# Patient Record
Sex: MALE | Race: WHITE | NOT HISPANIC OR LATINO | Employment: FULL TIME | URBAN - METROPOLITAN AREA
[De-identification: names, ages, dates, MRNs, and addresses within clinical notes are randomized per-mention and may not be internally consistent; named-entity substitution may affect disease eponyms.]

---

## 2019-10-10 ENCOUNTER — HISTORICAL (OUTPATIENT)
Dept: HEPATOLOGY | Facility: HOSPITAL | Age: 49
End: 2019-10-10

## 2019-10-10 LAB — POC CREATININE: 1.3 MG/DL (ref 0.6–1.3)

## 2019-10-15 ENCOUNTER — HISTORICAL (OUTPATIENT)
Dept: ADMINISTRATIVE | Facility: HOSPITAL | Age: 49
End: 2019-10-15

## 2019-10-15 LAB
ABS NEUT (OLG): 5.64 X10(3)/MCL (ref 2.1–9.2)
ALBUMIN SERPL-MCNC: 4 GM/DL (ref 3.4–5)
ALBUMIN/GLOB SERPL: 1.4 {RATIO}
ALP SERPL-CCNC: 60 UNIT/L (ref 50–136)
ALT SERPL-CCNC: 41 UNIT/L (ref 12–78)
AST SERPL-CCNC: 20 UNIT/L (ref 15–37)
BASOPHILS # BLD AUTO: 0 X10(3)/MCL (ref 0–0.2)
BASOPHILS NFR BLD AUTO: 0.4 %
BILIRUB SERPL-MCNC: 0.4 MG/DL (ref 0.2–1)
BILIRUBIN DIRECT+TOT PNL SERPL-MCNC: 0.1 MG/DL (ref 0–0.2)
BILIRUBIN DIRECT+TOT PNL SERPL-MCNC: 0.3 MG/DL (ref 0–0.8)
BUN SERPL-MCNC: 19 MG/DL (ref 7–18)
CALCIUM SERPL-MCNC: 9.7 MG/DL (ref 8.5–10.1)
CEA SERPL-MCNC: 1.8 NG/ML (ref 0–3)
CHLORIDE SERPL-SCNC: 100 MMOL/L (ref 98–107)
CO2 SERPL-SCNC: 27 MMOL/L (ref 21–32)
CREAT SERPL-MCNC: 1.22 MG/DL (ref 0.7–1.3)
EOSINOPHIL # BLD AUTO: 0.3 X10(3)/MCL (ref 0–0.9)
EOSINOPHIL NFR BLD AUTO: 3 %
ERYTHROCYTE [DISTWIDTH] IN BLOOD BY AUTOMATED COUNT: 12.4 % (ref 11.5–17)
GLOBULIN SER-MCNC: 2.9 GM/DL (ref 2.4–3.5)
GLUCOSE SERPL-MCNC: 91 MG/DL (ref 74–106)
HCT VFR BLD AUTO: 45.4 % (ref 42–52)
HGB BLD-MCNC: 14.7 GM/DL (ref 14–18)
LYMPHOCYTES # BLD AUTO: 2.3 X10(3)/MCL (ref 0.6–4.6)
LYMPHOCYTES NFR BLD AUTO: 25.2 %
MCH RBC QN AUTO: 30.4 PG (ref 27–31)
MCHC RBC AUTO-ENTMCNC: 32.4 GM/DL (ref 33–36)
MCV RBC AUTO: 93.8 FL (ref 80–94)
MONOCYTES # BLD AUTO: 1 X10(3)/MCL (ref 0.1–1.3)
MONOCYTES NFR BLD AUTO: 10.2 %
NEUTROPHILS # BLD AUTO: 5.6 X10(3)/MCL (ref 2.1–9.2)
NEUTROPHILS NFR BLD AUTO: 60.7 %
PLATELET # BLD AUTO: 305 X10(3)/MCL (ref 130–400)
PMV BLD AUTO: 8.6 FL (ref 9.4–12.4)
POTASSIUM SERPL-SCNC: 4.5 MMOL/L (ref 3.5–5.1)
PROT SERPL-MCNC: 6.9 GM/DL (ref 6.4–8.2)
RBC # BLD AUTO: 4.84 X10(6)/MCL (ref 4.7–6.1)
SODIUM SERPL-SCNC: 136 MMOL/L (ref 136–145)
WBC # SPEC AUTO: 9.3 X10(3)/MCL (ref 4.5–11.5)

## 2019-10-16 ENCOUNTER — HISTORICAL (OUTPATIENT)
Dept: RADIATION THERAPY | Facility: HOSPITAL | Age: 49
End: 2019-10-16

## 2019-10-24 ENCOUNTER — HISTORICAL (OUTPATIENT)
Dept: RADIATION THERAPY | Facility: HOSPITAL | Age: 49
End: 2019-10-24

## 2019-11-12 ENCOUNTER — HISTORICAL (OUTPATIENT)
Dept: ADMINISTRATIVE | Facility: HOSPITAL | Age: 49
End: 2019-11-12

## 2019-11-12 LAB
ABS NEUT (OLG): 2.72 X10(3)/MCL (ref 2.1–9.2)
ALBUMIN SERPL-MCNC: 4.4 GM/DL (ref 3.4–5)
ALP SERPL-CCNC: 52 UNIT/L (ref 50–136)
ALT SERPL-CCNC: 48 UNIT/L (ref 12–78)
ANION GAP SERPL CALC-SCNC: 17 MMOL/L
AST SERPL-CCNC: 27 UNIT/L (ref 15–37)
BASOPHILS # BLD AUTO: 0 X10(3)/MCL (ref 0–0.2)
BASOPHILS NFR BLD AUTO: 0.2 %
BILIRUB SERPL-MCNC: 0.5 MG/DL (ref 0.2–1)
BILIRUBIN DIRECT+TOT PNL SERPL-MCNC: 0.2 MG/DL (ref 0–0.5)
BILIRUBIN DIRECT+TOT PNL SERPL-MCNC: 0.3 MG/DL (ref 0–0.8)
BUN SERPL-MCNC: 19 MG/DL (ref 8–26)
CHLORIDE SERPL-SCNC: 96 MMOL/L (ref 98–109)
CREAT SERPL-MCNC: 1.5 MG/DL (ref 0.6–1.3)
EOSINOPHIL # BLD AUTO: 0.2 X10(3)/MCL (ref 0–0.9)
EOSINOPHIL NFR BLD AUTO: 3.9 %
ERYTHROCYTE [DISTWIDTH] IN BLOOD BY AUTOMATED COUNT: 12 % (ref 11.5–17)
GLUCOSE SERPL-MCNC: 83 MG/DL (ref 70–105)
HCT VFR BLD AUTO: 48.2 % (ref 42–52)
HCT VFR BLD CALC: 48 % (ref 38–51)
HGB BLD-MCNC: 15.9 GM/DL (ref 14–18)
HGB BLD-MCNC: 16.3 MG/DL (ref 12–17)
LIVER PROFILE INTERP: NORMAL
LYMPHOCYTES # BLD AUTO: 0.9 X10(3)/MCL (ref 0.6–4.6)
LYMPHOCYTES NFR BLD AUTO: 20.9 %
MCH RBC QN AUTO: 30.5 PG (ref 27–31)
MCHC RBC AUTO-ENTMCNC: 33 GM/DL (ref 33–36)
MCV RBC AUTO: 92.3 FL (ref 80–94)
MONOCYTES # BLD AUTO: 0.5 X10(3)/MCL (ref 0.1–1.3)
MONOCYTES NFR BLD AUTO: 11.4 %
NEUTROPHILS # BLD AUTO: 2.7 X10(3)/MCL (ref 2.1–9.2)
NEUTROPHILS NFR BLD AUTO: 63.1 %
PLATELET # BLD AUTO: 212 X10(3)/MCL (ref 130–400)
PMV BLD AUTO: 8.1 FL (ref 9.4–12.4)
POC IONIZED CALCIUM: 1.22 MMOL/L (ref 1.12–1.32)
POC TCO2: 27 MMOL/L (ref 24–29)
POTASSIUM BLD-SCNC: 4.5 MMOL/L (ref 3.5–4.9)
PROT SERPL-MCNC: 7.5 GM/DL (ref 6.4–8.2)
RBC # BLD AUTO: 5.22 X10(6)/MCL (ref 4.7–6.1)
SODIUM BLD-SCNC: 135 MMOL/L (ref 138–146)
WBC # SPEC AUTO: 4.3 X10(3)/MCL (ref 4.5–11.5)

## 2019-11-26 ENCOUNTER — HISTORICAL (OUTPATIENT)
Dept: ADMINISTRATIVE | Facility: HOSPITAL | Age: 49
End: 2019-11-26

## 2019-11-26 LAB
ABS NEUT (OLG): 3.4 X10(3)/MCL (ref 2.1–9.2)
ALBUMIN SERPL-MCNC: 3.8 GM/DL (ref 3.4–5)
ALP SERPL-CCNC: 60 UNIT/L (ref 50–136)
ALT SERPL-CCNC: 43 UNIT/L (ref 12–78)
ANION GAP SERPL CALC-SCNC: 15 MMOL/L
AST SERPL-CCNC: 34 UNIT/L (ref 15–37)
BASOPHILS # BLD AUTO: 0 X10(3)/MCL (ref 0–0.2)
BASOPHILS NFR BLD AUTO: 0.2 %
BILIRUB SERPL-MCNC: 0.6 MG/DL (ref 0.2–1)
BILIRUBIN DIRECT+TOT PNL SERPL-MCNC: 0.1 MG/DL (ref 0–0.5)
BILIRUBIN DIRECT+TOT PNL SERPL-MCNC: 0.5 MG/DL (ref 0–0.8)
BUN SERPL-MCNC: 24 MG/DL (ref 8–26)
CHLORIDE SERPL-SCNC: 97 MMOL/L (ref 98–109)
CREAT SERPL-MCNC: 1.5 MG/DL (ref 0.6–1.3)
EOSINOPHIL # BLD AUTO: 0.2 X10(3)/MCL (ref 0–0.9)
EOSINOPHIL NFR BLD AUTO: 5.4 %
ERYTHROCYTE [DISTWIDTH] IN BLOOD BY AUTOMATED COUNT: 12.5 % (ref 11.5–17)
GLUCOSE SERPL-MCNC: 77 MG/DL (ref 70–105)
HCT VFR BLD AUTO: 41.9 % (ref 42–52)
HCT VFR BLD CALC: 41 % (ref 38–51)
HGB BLD-MCNC: 13.9 MG/DL (ref 12–17)
HGB BLD-MCNC: 14.1 GM/DL (ref 14–18)
LIVER PROFILE INTERP: NORMAL
LYMPHOCYTES # BLD AUTO: 0.4 X10(3)/MCL (ref 0.6–4.6)
LYMPHOCYTES NFR BLD AUTO: 9.5 %
MCH RBC QN AUTO: 30.8 PG (ref 27–31)
MCHC RBC AUTO-ENTMCNC: 33.7 GM/DL (ref 33–36)
MCV RBC AUTO: 91.5 FL (ref 80–94)
MONOCYTES # BLD AUTO: 0.5 X10(3)/MCL (ref 0.1–1.3)
MONOCYTES NFR BLD AUTO: 11.4 %
NEUTROPHILS # BLD AUTO: 3.4 X10(3)/MCL (ref 2.1–9.2)
NEUTROPHILS NFR BLD AUTO: 73.5 %
PLATELET # BLD AUTO: 195 X10(3)/MCL (ref 130–400)
PMV BLD AUTO: 8.2 FL (ref 9.4–12.4)
POC IONIZED CALCIUM: 1.25 MMOL/L (ref 1.12–1.32)
POC TCO2: 29 MMOL/L (ref 24–29)
POTASSIUM BLD-SCNC: 4.4 MMOL/L (ref 3.5–4.9)
PROT SERPL-MCNC: 6.9 GM/DL (ref 6.4–8.2)
RBC # BLD AUTO: 4.58 X10(6)/MCL (ref 4.7–6.1)
SODIUM BLD-SCNC: 136 MMOL/L (ref 138–146)
WBC # SPEC AUTO: 4.6 X10(3)/MCL (ref 4.5–11.5)

## 2019-12-10 ENCOUNTER — HISTORICAL (OUTPATIENT)
Dept: ADMINISTRATIVE | Facility: HOSPITAL | Age: 49
End: 2019-12-10

## 2019-12-10 LAB
ABS NEUT (OLG): 2.76 X10(3)/MCL (ref 2.1–9.2)
ALBUMIN SERPL-MCNC: 3.9 GM/DL (ref 3.4–5)
ALP SERPL-CCNC: 59 UNIT/L (ref 50–136)
ALT SERPL-CCNC: 38 UNIT/L (ref 12–78)
ANION GAP SERPL CALC-SCNC: 12 MMOL/L
AST SERPL-CCNC: 17 UNIT/L (ref 15–37)
BASOPHILS # BLD AUTO: 0 X10(3)/MCL (ref 0–0.2)
BASOPHILS NFR BLD AUTO: 0.2 %
BILIRUB SERPL-MCNC: 0.6 MG/DL (ref 0.2–1)
BILIRUBIN DIRECT+TOT PNL SERPL-MCNC: 0.1 MG/DL (ref 0–0.2)
BILIRUBIN DIRECT+TOT PNL SERPL-MCNC: 0.5 MG/DL (ref 0–0.8)
BUN SERPL-MCNC: 17 MG/DL (ref 8–26)
CHLORIDE SERPL-SCNC: 101 MMOL/L (ref 98–109)
CREAT SERPL-MCNC: 1.4 MG/DL (ref 0.6–1.3)
EOSINOPHIL # BLD AUTO: 0.2 X10(3)/MCL (ref 0–0.9)
EOSINOPHIL NFR BLD AUTO: 5 %
EOSINOPHIL NFR BLD MANUAL: 5 % (ref 0–8)
ERYTHROCYTE [DISTWIDTH] IN BLOOD BY AUTOMATED COUNT: 14.1 % (ref 11.5–17)
GLUCOSE SERPL-MCNC: 94 MG/DL (ref 70–105)
HCT VFR BLD AUTO: 39.8 % (ref 42–52)
HCT VFR BLD CALC: 40 % (ref 38–51)
HGB BLD-MCNC: 13.3 GM/DL (ref 14–18)
HGB BLD-MCNC: 13.6 MG/DL (ref 12–17)
LIVER PROFILE INTERP: NORMAL
LYMPHOCYTES # BLD AUTO: 0.5 X10(3)/MCL (ref 0.6–4.6)
LYMPHOCYTES NFR BLD AUTO: 12.5 %
LYMPHOCYTES NFR BLD MANUAL: 14 % (ref 13–40)
MCH RBC QN AUTO: 31.1 PG (ref 27–31)
MCHC RBC AUTO-ENTMCNC: 33.4 GM/DL (ref 33–36)
MCV RBC AUTO: 93 FL (ref 80–94)
MONOCYTES # BLD AUTO: 0.6 X10(3)/MCL (ref 0.1–1.3)
MONOCYTES NFR BLD AUTO: 15.4 %
MONOCYTES NFR BLD MANUAL: 16 % (ref 2–11)
MYELOCYTES NFR BLD MANUAL: 1 %
NEUTROPHILS # BLD AUTO: 2.8 X10(3)/MCL (ref 2.1–9.2)
NEUTROPHILS NFR BLD AUTO: 65.2 %
NEUTROPHILS NFR BLD MANUAL: 64 % (ref 47–80)
PLATELET # BLD AUTO: 251 X10(3)/MCL (ref 130–400)
PLATELET # BLD EST: NORMAL 10*3/UL
PMV BLD AUTO: 8 FL (ref 9.4–12.4)
POC IONIZED CALCIUM: 1.19 MMOL/L (ref 1.12–1.32)
POC TCO2: 28 MMOL/L (ref 24–29)
POTASSIUM BLD-SCNC: 3.7 MMOL/L (ref 3.5–4.9)
PROT SERPL-MCNC: 6.6 GM/DL (ref 6.4–8.2)
RBC # BLD AUTO: 4.28 X10(6)/MCL (ref 4.7–6.1)
RBC MORPH BLD: NORMAL
SODIUM BLD-SCNC: 136 MMOL/L (ref 138–146)
WBC # SPEC AUTO: 4.2 X10(3)/MCL (ref 4.5–11.5)

## 2020-01-02 ENCOUNTER — HISTORICAL (OUTPATIENT)
Dept: RADIOLOGY | Facility: HOSPITAL | Age: 50
End: 2020-01-02

## 2020-01-07 ENCOUNTER — HISTORICAL (OUTPATIENT)
Dept: ADMINISTRATIVE | Facility: HOSPITAL | Age: 50
End: 2020-01-07

## 2020-01-07 LAB
ABS NEUT (OLG): 3.14 X10(3)/MCL (ref 2.1–9.2)
ALBUMIN SERPL-MCNC: 4.1 GM/DL (ref 3.4–5)
ALP SERPL-CCNC: 67 UNIT/L (ref 50–136)
ALT SERPL-CCNC: 47 UNIT/L (ref 12–78)
ANION GAP SERPL CALC-SCNC: 12 MMOL/L
AST SERPL-CCNC: 27 UNIT/L (ref 15–37)
BASOPHILS # BLD AUTO: 0 X10(3)/MCL (ref 0–0.2)
BASOPHILS NFR BLD AUTO: 0.7 %
BILIRUB SERPL-MCNC: 0.8 MG/DL (ref 0.2–1)
BILIRUBIN DIRECT+TOT PNL SERPL-MCNC: 0.1 MG/DL (ref 0–0.2)
BILIRUBIN DIRECT+TOT PNL SERPL-MCNC: 0.7 MG/DL (ref 0–0.8)
BUN SERPL-MCNC: 20 MG/DL (ref 8–26)
CHLORIDE SERPL-SCNC: 103 MMOL/L (ref 98–109)
CREAT SERPL-MCNC: 1.2 MG/DL (ref 0.6–1.3)
EOSINOPHIL # BLD AUTO: 0.1 X10(3)/MCL (ref 0–0.9)
EOSINOPHIL NFR BLD AUTO: 3.1 %
ERYTHROCYTE [DISTWIDTH] IN BLOOD BY AUTOMATED COUNT: 15.8 % (ref 11.5–17)
GLUCOSE SERPL-MCNC: 79 MG/DL (ref 70–105)
HCT VFR BLD AUTO: 39.8 % (ref 42–52)
HCT VFR BLD CALC: 40 % (ref 38–51)
HGB BLD-MCNC: 13.2 GM/DL (ref 14–18)
HGB BLD-MCNC: 13.6 MG/DL (ref 12–17)
LIVER PROFILE INTERP: NORMAL
LYMPHOCYTES # BLD AUTO: 0.7 X10(3)/MCL (ref 0.6–4.6)
LYMPHOCYTES NFR BLD AUTO: 15.1 %
MCH RBC QN AUTO: 31.7 PG (ref 27–31)
MCHC RBC AUTO-ENTMCNC: 33.2 GM/DL (ref 33–36)
MCV RBC AUTO: 95.7 FL (ref 80–94)
MONOCYTES # BLD AUTO: 0.4 X10(3)/MCL (ref 0.1–1.3)
MONOCYTES NFR BLD AUTO: 10.1 %
NEUTROPHILS # BLD AUTO: 3.1 X10(3)/MCL (ref 2.1–9.2)
NEUTROPHILS NFR BLD AUTO: 70.6 %
PLATELET # BLD AUTO: 260 X10(3)/MCL (ref 130–400)
PMV BLD AUTO: 9 FL (ref 9.4–12.4)
POC IONIZED CALCIUM: 1.15 MMOL/L (ref 1.12–1.32)
POC TCO2: 27 MMOL/L (ref 24–29)
POTASSIUM BLD-SCNC: 4.1 MMOL/L (ref 3.5–4.9)
PROT SERPL-MCNC: 6.8 GM/DL (ref 6.4–8.2)
RBC # BLD AUTO: 4.16 X10(6)/MCL (ref 4.7–6.1)
SODIUM BLD-SCNC: 137 MMOL/L (ref 138–146)
WBC # SPEC AUTO: 4.4 X10(3)/MCL (ref 4.5–11.5)

## 2020-01-09 ENCOUNTER — HISTORICAL (OUTPATIENT)
Dept: ADMINISTRATIVE | Facility: HOSPITAL | Age: 50
End: 2020-01-09

## 2020-01-09 LAB
ALBUMIN SERPL-MCNC: 3.8 GM/DL (ref 3.4–5)
ALBUMIN/GLOB SERPL: 1.6 {RATIO}
ALP SERPL-CCNC: 59 UNIT/L (ref 50–136)
ALT SERPL-CCNC: 41 UNIT/L (ref 12–78)
AST SERPL-CCNC: 21 UNIT/L (ref 15–37)
BILIRUB SERPL-MCNC: 0.6 MG/DL (ref 0.2–1)
BILIRUBIN DIRECT+TOT PNL SERPL-MCNC: 0.1 MG/DL (ref 0–0.2)
BILIRUBIN DIRECT+TOT PNL SERPL-MCNC: 0.5 MG/DL (ref 0–0.8)
BUN SERPL-MCNC: 22 MG/DL (ref 7–18)
CALCIUM SERPL-MCNC: 9.6 MG/DL (ref 8.5–10.1)
CEA SERPL-MCNC: 2.1 NG/ML (ref 0–3)
CHLORIDE SERPL-SCNC: 103 MMOL/L (ref 98–107)
CO2 SERPL-SCNC: 28 MMOL/L (ref 21–32)
CREAT SERPL-MCNC: 1.17 MG/DL (ref 0.7–1.3)
GLOBULIN SER-MCNC: 2.4 GM/DL (ref 2.4–3.5)
GLUCOSE SERPL-MCNC: 83 MG/DL (ref 74–106)
POTASSIUM SERPL-SCNC: 4.4 MMOL/L (ref 3.5–5.1)
PROT SERPL-MCNC: 6.2 GM/DL (ref 6.4–8.2)
SODIUM SERPL-SCNC: 137 MMOL/L (ref 136–145)

## 2020-01-28 ENCOUNTER — HISTORICAL (OUTPATIENT)
Dept: ADMINISTRATIVE | Facility: HOSPITAL | Age: 50
End: 2020-01-28

## 2020-01-28 LAB
ABS NEUT (OLG): 4.56 X10(3)/MCL (ref 2.1–9.2)
ALBUMIN SERPL-MCNC: 3.6 GM/DL (ref 3.4–5)
ALBUMIN/GLOB SERPL: 1.1 RATIO (ref 1.1–2)
ALP SERPL-CCNC: 92 UNIT/L (ref 50–136)
ALT SERPL-CCNC: 30 UNIT/L (ref 12–78)
AST SERPL-CCNC: 13 UNIT/L (ref 15–37)
BASOPHILS # BLD AUTO: 0 X10(3)/MCL (ref 0–0.2)
BASOPHILS NFR BLD AUTO: 0.6 %
BILIRUB SERPL-MCNC: 0.4 MG/DL (ref 0.2–1)
BILIRUBIN DIRECT+TOT PNL SERPL-MCNC: 0.1 MG/DL (ref 0–0.5)
BILIRUBIN DIRECT+TOT PNL SERPL-MCNC: 0.3 MG/DL (ref 0–0.8)
BUN SERPL-MCNC: 17 MG/DL (ref 7–18)
CALCIUM SERPL-MCNC: 9.3 MG/DL (ref 8.5–10.1)
CEA SERPL-MCNC: 1.7 NG/ML (ref 0–3)
CHLORIDE SERPL-SCNC: 104 MMOL/L (ref 98–107)
CO2 SERPL-SCNC: 30 MMOL/L (ref 21–32)
CREAT SERPL-MCNC: 1.02 MG/DL (ref 0.7–1.3)
EOSINOPHIL # BLD AUTO: 0.2 X10(3)/MCL (ref 0–0.9)
EOSINOPHIL NFR BLD AUTO: 3 %
ERYTHROCYTE [DISTWIDTH] IN BLOOD BY AUTOMATED COUNT: 14.4 % (ref 11.5–17)
GLOBULIN SER-MCNC: 3.4 GM/DL (ref 2.4–3.5)
GLUCOSE SERPL-MCNC: 92 MG/DL (ref 74–106)
HCT VFR BLD AUTO: 38.3 % (ref 42–52)
HGB BLD-MCNC: 12.4 GM/DL (ref 14–18)
LYMPHOCYTES # BLD AUTO: 1.2 X10(3)/MCL (ref 0.6–4.6)
LYMPHOCYTES NFR BLD AUTO: 17.6 %
MCH RBC QN AUTO: 31.6 PG (ref 27–31)
MCHC RBC AUTO-ENTMCNC: 32.4 GM/DL (ref 33–36)
MCV RBC AUTO: 97.7 FL (ref 80–94)
MONOCYTES # BLD AUTO: 0.7 X10(3)/MCL (ref 0.1–1.3)
MONOCYTES NFR BLD AUTO: 9.8 %
NEUTROPHILS # BLD AUTO: 4.6 X10(3)/MCL (ref 2.1–9.2)
NEUTROPHILS NFR BLD AUTO: 67.8 %
PLATELET # BLD AUTO: 413 X10(3)/MCL (ref 130–400)
PMV BLD AUTO: 8.2 FL (ref 9.4–12.4)
POTASSIUM SERPL-SCNC: 4.4 MMOL/L (ref 3.5–5.1)
PROT SERPL-MCNC: 7 GM/DL (ref 6.4–8.2)
RBC # BLD AUTO: 3.92 X10(6)/MCL (ref 4.7–6.1)
SODIUM SERPL-SCNC: 139 MMOL/L (ref 136–145)
WBC # SPEC AUTO: 6.7 X10(3)/MCL (ref 4.5–11.5)

## 2020-02-05 ENCOUNTER — HISTORICAL (OUTPATIENT)
Dept: ADMINISTRATIVE | Facility: HOSPITAL | Age: 50
End: 2020-02-05

## 2020-02-26 ENCOUNTER — HISTORICAL (OUTPATIENT)
Dept: INFUSION THERAPY | Facility: HOSPITAL | Age: 50
End: 2020-02-26

## 2020-02-26 LAB
ABS NEUT (OLG): 3.06 X10(3)/MCL (ref 2.1–9.2)
ALBUMIN SERPL-MCNC: 3.8 GM/DL (ref 3.4–5)
ALP SERPL-CCNC: 86 UNIT/L (ref 50–136)
ALT SERPL-CCNC: 36 UNIT/L (ref 12–78)
ANION GAP SERPL CALC-SCNC: 15 MMOL/L
AST SERPL-CCNC: 21 UNIT/L (ref 15–37)
BASOPHILS # BLD AUTO: 0 X10(3)/MCL (ref 0–0.2)
BASOPHILS NFR BLD AUTO: 0.4 %
BILIRUB SERPL-MCNC: 0.4 MG/DL (ref 0.2–1)
BILIRUBIN DIRECT+TOT PNL SERPL-MCNC: 0.1 MG/DL (ref 0–0.5)
BILIRUBIN DIRECT+TOT PNL SERPL-MCNC: 0.3 MG/DL (ref 0–0.8)
BUN SERPL-MCNC: 18 MG/DL (ref 8–26)
CEA SERPL-MCNC: 1.5 NG/ML (ref 0–3)
CHLORIDE SERPL-SCNC: 100 MMOL/L (ref 98–109)
CREAT SERPL-MCNC: 1.1 MG/DL (ref 0.6–1.3)
EOSINOPHIL # BLD AUTO: 0.2 X10(3)/MCL (ref 0–0.9)
EOSINOPHIL NFR BLD AUTO: 3.4 %
ERYTHROCYTE [DISTWIDTH] IN BLOOD BY AUTOMATED COUNT: 13.2 % (ref 11.5–17)
GLUCOSE SERPL-MCNC: 99 MG/DL (ref 70–105)
HCT VFR BLD AUTO: 40 % (ref 42–52)
HCT VFR BLD CALC: 39 % (ref 38–51)
HGB BLD-MCNC: 13 GM/DL (ref 14–18)
HGB BLD-MCNC: 13.3 MG/DL (ref 12–17)
LIVER PROFILE INTERP: NORMAL
LYMPHOCYTES # BLD AUTO: 1 X10(3)/MCL (ref 0.6–4.6)
LYMPHOCYTES NFR BLD AUTO: 21.2 %
MCH RBC QN AUTO: 32.1 PG (ref 27–31)
MCHC RBC AUTO-ENTMCNC: 32.5 GM/DL (ref 33–36)
MCV RBC AUTO: 98.8 FL (ref 80–94)
MONOCYTES # BLD AUTO: 0.5 X10(3)/MCL (ref 0.1–1.3)
MONOCYTES NFR BLD AUTO: 10.1 %
NEUTROPHILS # BLD AUTO: 3.1 X10(3)/MCL (ref 2.1–9.2)
NEUTROPHILS NFR BLD AUTO: 64.3 %
PLATELET # BLD AUTO: 262 X10(3)/MCL (ref 130–400)
PMV BLD AUTO: 8.6 FL (ref 9.4–12.4)
POC IONIZED CALCIUM: 1.23 MMOL/L (ref 1.12–1.32)
POC TCO2: 27 MMOL/L (ref 24–29)
POTASSIUM BLD-SCNC: 3.9 MMOL/L (ref 3.5–4.9)
PROT SERPL-MCNC: 6.8 GM/DL (ref 6.4–8.2)
RBC # BLD AUTO: 4.05 X10(6)/MCL (ref 4.7–6.1)
SODIUM BLD-SCNC: 137 MMOL/L (ref 138–146)
WBC # SPEC AUTO: 4.8 X10(3)/MCL (ref 4.5–11.5)

## 2020-03-03 ENCOUNTER — HISTORICAL (OUTPATIENT)
Dept: ADMINISTRATIVE | Facility: HOSPITAL | Age: 50
End: 2020-03-03

## 2020-03-03 LAB
ABS NEUT (OLG): 2.7 X10(3)/MCL (ref 2.1–9.2)
ALBUMIN SERPL-MCNC: 4 GM/DL (ref 3.4–5)
ALBUMIN/GLOB SERPL: 1.3 RATIO (ref 1.1–2)
ALP SERPL-CCNC: 76 UNIT/L (ref 50–136)
ALT SERPL-CCNC: 35 UNIT/L (ref 12–78)
AST SERPL-CCNC: 20 UNIT/L (ref 15–37)
BASOPHILS # BLD AUTO: 0 X10(3)/MCL (ref 0–0.2)
BASOPHILS NFR BLD AUTO: 0.4 %
BILIRUB SERPL-MCNC: 0.8 MG/DL (ref 0.2–1)
BILIRUBIN DIRECT+TOT PNL SERPL-MCNC: 0.2 MG/DL (ref 0–0.5)
BILIRUBIN DIRECT+TOT PNL SERPL-MCNC: 0.6 MG/DL (ref 0–0.8)
BUN SERPL-MCNC: 28 MG/DL (ref 7–18)
CALCIUM SERPL-MCNC: 9.9 MG/DL (ref 8.5–10.1)
CHLORIDE SERPL-SCNC: 98 MMOL/L (ref 98–107)
CO2 SERPL-SCNC: 26 MMOL/L (ref 21–32)
CREAT SERPL-MCNC: 1.35 MG/DL (ref 0.7–1.3)
EOSINOPHIL # BLD AUTO: 0 X10(3)/MCL (ref 0–0.9)
EOSINOPHIL NFR BLD AUTO: 0.6 %
EOSINOPHIL NFR BLD MANUAL: 1 % (ref 0–8)
ERYTHROCYTE [DISTWIDTH] IN BLOOD BY AUTOMATED COUNT: 12.2 % (ref 11.5–17)
GLOBULIN SER-MCNC: 3 GM/DL (ref 2.4–3.5)
GLUCOSE SERPL-MCNC: 102 MG/DL (ref 74–106)
HCT VFR BLD AUTO: 42.1 % (ref 42–52)
HGB BLD-MCNC: 14.4 GM/DL (ref 14–18)
LYMPHOCYTES # BLD AUTO: 1.2 X10(3)/MCL (ref 0.6–4.6)
LYMPHOCYTES NFR BLD AUTO: 24.7 %
LYMPHOCYTES NFR BLD MANUAL: 24 % (ref 13–40)
MCH RBC QN AUTO: 31.8 PG (ref 27–31)
MCHC RBC AUTO-ENTMCNC: 34.2 GM/DL (ref 33–36)
MCV RBC AUTO: 92.9 FL (ref 80–94)
MONOCYTES # BLD AUTO: 0.8 X10(3)/MCL (ref 0.1–1.3)
MONOCYTES NFR BLD AUTO: 17.4 %
MONOCYTES NFR BLD MANUAL: 14 % (ref 2–11)
NEUTROPHILS # BLD AUTO: 2.7 X10(3)/MCL (ref 2.1–9.2)
NEUTROPHILS NFR BLD AUTO: 56.7 %
NEUTROPHILS NFR BLD MANUAL: 61 % (ref 47–80)
PLATELET # BLD AUTO: 304 X10(3)/MCL (ref 130–400)
PLATELET # BLD EST: NORMAL 10*3/UL
PMV BLD AUTO: 8.5 FL (ref 9.4–12.4)
POTASSIUM SERPL-SCNC: 4.1 MMOL/L (ref 3.5–5.1)
PROT SERPL-MCNC: 7 GM/DL (ref 6.4–8.2)
RBC # BLD AUTO: 4.53 X10(6)/MCL (ref 4.7–6.1)
RBC MORPH BLD: NORMAL
SODIUM SERPL-SCNC: 132 MMOL/L (ref 136–145)
WBC # SPEC AUTO: 4.8 X10(3)/MCL (ref 4.5–11.5)

## 2020-03-12 ENCOUNTER — HISTORICAL (OUTPATIENT)
Dept: RADIATION THERAPY | Facility: HOSPITAL | Age: 50
End: 2020-03-12

## 2020-03-12 ENCOUNTER — HISTORICAL (OUTPATIENT)
Dept: HEMATOLOGY/ONCOLOGY | Facility: CLINIC | Age: 50
End: 2020-03-12

## 2020-03-12 LAB
ABS NEUT (OLG): 3.79 X10(3)/MCL (ref 2.1–9.2)
ALBUMIN SERPL-MCNC: 3.6 GM/DL (ref 3.4–5)
ALBUMIN/GLOB SERPL: 1.2 {RATIO}
ALP SERPL-CCNC: 79 UNIT/L (ref 45–117)
ALT SERPL-CCNC: 104 UNIT/L (ref 16–61)
AST SERPL-CCNC: 53 UNIT/L (ref 15–37)
BASOPHILS # BLD AUTO: 0 X10(3)/MCL (ref 0–0.2)
BASOPHILS NFR BLD AUTO: 0.2 %
BILIRUB SERPL-MCNC: 0.2 MG/DL (ref 0.2–1)
BILIRUBIN DIRECT+TOT PNL SERPL-MCNC: <0.1 MG/DL (ref 0–0.2)
BILIRUBIN DIRECT+TOT PNL SERPL-MCNC: >0.1 MG/DL (ref 0–1)
BUN SERPL-MCNC: 15 MG/DL (ref 7–18)
CALCIUM SERPL-MCNC: 8.7 MG/DL (ref 8.5–10.1)
CHLORIDE SERPL-SCNC: 105 MMOL/L (ref 98–107)
CO2 SERPL-SCNC: 26 MMOL/L (ref 21–32)
CREAT SERPL-MCNC: 1.38 MG/DL (ref 0.7–1.3)
EOSINOPHIL # BLD AUTO: 0 X10(3)/MCL (ref 0–0.9)
EOSINOPHIL NFR BLD AUTO: 0.8 %
ERYTHROCYTE [DISTWIDTH] IN BLOOD BY AUTOMATED COUNT: 12.6 % (ref 11.5–17)
GLOBULIN SER-MCNC: 3 GM/DL (ref 2–4)
GLUCOSE SERPL-MCNC: 133 MG/DL (ref 74–106)
HCT VFR BLD AUTO: 37.2 % (ref 42–52)
HGB BLD-MCNC: 11.6 GM/DL (ref 14–18)
LYMPHOCYTES # BLD AUTO: 0.8 X10(3)/MCL (ref 0.6–4.6)
LYMPHOCYTES NFR BLD AUTO: 15.7 %
MCH RBC QN AUTO: 32.2 PG (ref 27–31)
MCHC RBC AUTO-ENTMCNC: 31.2 GM/DL (ref 33–36)
MCV RBC AUTO: 103.3 FL (ref 80–94)
MONOCYTES # BLD AUTO: 0.4 X10(3)/MCL (ref 0.1–1.3)
MONOCYTES NFR BLD AUTO: 8.4 %
NEUTROPHILS # BLD AUTO: 3.8 X10(3)/MCL (ref 2.1–9.2)
NEUTROPHILS NFR BLD AUTO: 74.1 %
PLATELET # BLD AUTO: 230 X10(3)/MCL (ref 130–400)
PMV BLD AUTO: 9 FL (ref 9.4–12.4)
POTASSIUM SERPL-SCNC: 4.3 MMOL/L (ref 3.5–5.1)
PROT SERPL-MCNC: 6.5 GM/DL (ref 6.4–8.2)
RBC # BLD AUTO: 3.6 X10(6)/MCL (ref 4.7–6.1)
SODIUM SERPL-SCNC: 139 MMOL/L (ref 136–145)
WBC # SPEC AUTO: 5.1 X10(3)/MCL (ref 4.5–11.5)

## 2020-03-18 ENCOUNTER — HISTORICAL (OUTPATIENT)
Dept: INFUSION THERAPY | Facility: HOSPITAL | Age: 50
End: 2020-03-18

## 2020-03-18 LAB
ABS NEUT (OLG): 2.23 X10(3)/MCL (ref 2.1–9.2)
ANION GAP SERPL CALC-SCNC: 19 MMOL/L
BASOPHILS # BLD AUTO: 0 X10(3)/MCL (ref 0–0.2)
BASOPHILS NFR BLD AUTO: 0.4 %
BUN SERPL-MCNC: 18 MG/DL (ref 8–26)
CHLORIDE SERPL-SCNC: 99 MMOL/L (ref 98–109)
CREAT SERPL-MCNC: 1.2 MG/DL (ref 0.6–1.3)
EOSINOPHIL # BLD AUTO: 0.1 X10(3)/MCL (ref 0–0.9)
EOSINOPHIL NFR BLD AUTO: 2.4 %
ERYTHROCYTE [DISTWIDTH] IN BLOOD BY AUTOMATED COUNT: 14 % (ref 11.5–17)
GLUCOSE SERPL-MCNC: 83 MG/DL (ref 70–105)
HCT VFR BLD AUTO: 39.7 % (ref 42–52)
HCT VFR BLD CALC: 40 % (ref 38–51)
HGB BLD-MCNC: 12.9 GM/DL (ref 14–18)
HGB BLD-MCNC: 13.6 MG/DL (ref 12–17)
LYMPHOCYTES # BLD AUTO: 1.3 X10(3)/MCL (ref 0.6–4.6)
LYMPHOCYTES NFR BLD AUTO: 28 %
MCH RBC QN AUTO: 32.3 PG (ref 27–31)
MCHC RBC AUTO-ENTMCNC: 32.5 GM/DL (ref 33–36)
MCV RBC AUTO: 99.5 FL (ref 80–94)
MONOCYTES # BLD AUTO: 0.9 X10(3)/MCL (ref 0.1–1.3)
MONOCYTES NFR BLD AUTO: 18.9 %
NEUTROPHILS # BLD AUTO: 2.2 X10(3)/MCL (ref 2.1–9.2)
NEUTROPHILS NFR BLD AUTO: 49.2 %
PLATELET # BLD AUTO: 286 X10(3)/MCL (ref 130–400)
PMV BLD AUTO: 8.1 FL (ref 9.4–12.4)
POC IONIZED CALCIUM: 1.25 MMOL/L (ref 1.12–1.32)
POC TCO2: 25 MMOL/L (ref 24–29)
POTASSIUM BLD-SCNC: 3.6 MMOL/L (ref 3.5–4.9)
RBC # BLD AUTO: 3.99 X10(6)/MCL (ref 4.7–6.1)
SODIUM BLD-SCNC: 138 MMOL/L (ref 138–146)
WBC # SPEC AUTO: 4.5 X10(3)/MCL (ref 4.5–11.5)

## 2020-03-24 ENCOUNTER — HISTORICAL (OUTPATIENT)
Dept: ADMINISTRATIVE | Facility: HOSPITAL | Age: 50
End: 2020-03-24

## 2020-03-24 LAB
ABS NEUT (OLG): 2.34 X10(3)/MCL (ref 2.1–9.2)
ALBUMIN SERPL-MCNC: 3.9 GM/DL (ref 3.4–5)
ALBUMIN/GLOB SERPL: 1.3 RATIO (ref 1.1–2)
ALP SERPL-CCNC: 88 UNIT/L (ref 50–136)
ALT SERPL-CCNC: 74 UNIT/L (ref 12–78)
AST SERPL-CCNC: 23 UNIT/L (ref 15–37)
BASOPHILS # BLD AUTO: 0 X10(3)/MCL (ref 0–0.2)
BASOPHILS NFR BLD AUTO: 0.2 %
BILIRUB SERPL-MCNC: 0.4 MG/DL (ref 0.2–1)
BILIRUBIN DIRECT+TOT PNL SERPL-MCNC: 0.1 MG/DL (ref 0–0.5)
BILIRUBIN DIRECT+TOT PNL SERPL-MCNC: 0.3 MG/DL (ref 0–0.8)
BUN SERPL-MCNC: 16 MG/DL (ref 7–18)
CALCIUM SERPL-MCNC: 9.2 MG/DL (ref 8.5–10.1)
CHLORIDE SERPL-SCNC: 102 MMOL/L (ref 98–107)
CO2 SERPL-SCNC: 32 MMOL/L (ref 21–32)
CREAT SERPL-MCNC: 1.05 MG/DL (ref 0.7–1.3)
EOSINOPHIL # BLD AUTO: 0.1 X10(3)/MCL (ref 0–0.9)
EOSINOPHIL NFR BLD AUTO: 1.4 %
ERYTHROCYTE [DISTWIDTH] IN BLOOD BY AUTOMATED COUNT: 13.3 % (ref 11.5–17)
GLOBULIN SER-MCNC: 3.1 GM/DL (ref 2.4–3.5)
GLUCOSE SERPL-MCNC: 87 MG/DL (ref 74–106)
HCT VFR BLD AUTO: 41.9 % (ref 42–52)
HGB BLD-MCNC: 13.8 GM/DL (ref 14–18)
LYMPHOCYTES # BLD AUTO: 1.1 X10(3)/MCL (ref 0.6–4.6)
LYMPHOCYTES NFR BLD AUTO: 26 %
MCH RBC QN AUTO: 31.9 PG (ref 27–31)
MCHC RBC AUTO-ENTMCNC: 32.9 GM/DL (ref 33–36)
MCV RBC AUTO: 96.8 FL (ref 80–94)
MONOCYTES # BLD AUTO: 0.8 X10(3)/MCL (ref 0.1–1.3)
MONOCYTES NFR BLD AUTO: 18 %
NEUTROPHILS # BLD AUTO: 2.3 X10(3)/MCL (ref 2.1–9.2)
NEUTROPHILS NFR BLD AUTO: 53.9 %
PLATELET # BLD AUTO: 217 X10(3)/MCL (ref 130–400)
PMV BLD AUTO: 8.5 FL (ref 9.4–12.4)
POTASSIUM SERPL-SCNC: 4.2 MMOL/L (ref 3.5–5.1)
PROT SERPL-MCNC: 7 GM/DL (ref 6.4–8.2)
RBC # BLD AUTO: 4.33 X10(6)/MCL (ref 4.7–6.1)
SODIUM SERPL-SCNC: 136 MMOL/L (ref 136–145)
WBC # SPEC AUTO: 4.3 X10(3)/MCL (ref 4.5–11.5)

## 2020-04-08 ENCOUNTER — HISTORICAL (OUTPATIENT)
Dept: INFUSION THERAPY | Facility: HOSPITAL | Age: 50
End: 2020-04-08

## 2020-04-08 LAB
ABS NEUT (OLG): 2.3 X10(3)/MCL (ref 2.1–9.2)
ANION GAP SERPL CALC-SCNC: 14 MMOL/L
BASOPHILS # BLD AUTO: 0 X10(3)/MCL (ref 0–0.2)
BASOPHILS NFR BLD AUTO: 0.3 %
BUN SERPL-MCNC: 20 MG/DL (ref 8–26)
CHLORIDE SERPL-SCNC: 103 MMOL/L (ref 98–109)
CREAT SERPL-MCNC: 1.2 MG/DL (ref 0.6–1.3)
EOSINOPHIL # BLD AUTO: 0.1 X10(3)/MCL (ref 0–0.9)
EOSINOPHIL NFR BLD AUTO: 2 %
ERYTHROCYTE [DISTWIDTH] IN BLOOD BY AUTOMATED COUNT: 15.3 % (ref 11.5–17)
GLUCOSE SERPL-MCNC: 84 MG/DL (ref 70–105)
HCT VFR BLD AUTO: 38.3 % (ref 42–52)
HCT VFR BLD CALC: 38 % (ref 38–51)
HGB BLD-MCNC: 12.7 GM/DL (ref 14–18)
HGB BLD-MCNC: 12.9 MG/DL (ref 12–17)
LYMPHOCYTES # BLD AUTO: 0.8 X10(3)/MCL (ref 0.6–4.6)
LYMPHOCYTES NFR BLD AUTO: 21.5 %
MCH RBC QN AUTO: 32.4 PG (ref 27–31)
MCHC RBC AUTO-ENTMCNC: 33.2 GM/DL (ref 33–36)
MCV RBC AUTO: 97.7 FL (ref 80–94)
MONOCYTES # BLD AUTO: 0.7 X10(3)/MCL (ref 0.1–1.3)
MONOCYTES NFR BLD AUTO: 17.9 %
NEUTROPHILS # BLD AUTO: 2.3 X10(3)/MCL (ref 2.1–9.2)
NEUTROPHILS NFR BLD AUTO: 58 %
PLATELET # BLD AUTO: 209 X10(3)/MCL (ref 130–400)
PMV BLD AUTO: 8.4 FL (ref 9.4–12.4)
POC IONIZED CALCIUM: 1.19 MMOL/L (ref 1.12–1.32)
POC TCO2: 26 MMOL/L (ref 24–29)
POTASSIUM BLD-SCNC: 3.8 MMOL/L (ref 3.5–4.9)
RBC # BLD AUTO: 3.92 X10(6)/MCL (ref 4.7–6.1)
SODIUM BLD-SCNC: 137 MMOL/L (ref 138–146)
WBC # SPEC AUTO: 4 X10(3)/MCL (ref 4.5–11.5)

## 2020-04-15 ENCOUNTER — HISTORICAL (OUTPATIENT)
Dept: ADMINISTRATIVE | Facility: HOSPITAL | Age: 50
End: 2020-04-15

## 2020-04-15 LAB
ABS NEUT (OLG): 2.43 X10(3)/MCL (ref 2.1–9.2)
ALBUMIN SERPL-MCNC: 3.7 GM/DL (ref 3.5–5)
ALBUMIN/GLOB SERPL: 1.2 RATIO (ref 1.1–2)
ALP SERPL-CCNC: 86 UNIT/L (ref 40–150)
ALT SERPL-CCNC: 40 UNIT/L (ref 0–55)
AST SERPL-CCNC: 25 UNIT/L (ref 5–34)
BASOPHILS # BLD AUTO: 0 X10(3)/MCL (ref 0–0.2)
BASOPHILS NFR BLD AUTO: 0.2 %
BILIRUB SERPL-MCNC: 0.4 MG/DL
BILIRUBIN DIRECT+TOT PNL SERPL-MCNC: 0.1 MG/DL (ref 0–0.5)
BILIRUBIN DIRECT+TOT PNL SERPL-MCNC: 0.3 MG/DL (ref 0–0.8)
BUN SERPL-MCNC: 20 MG/DL (ref 8.4–25.7)
CALCIUM SERPL-MCNC: 8.9 MG/DL (ref 8.4–10.2)
CHLORIDE SERPL-SCNC: 103 MMOL/L (ref 98–107)
CO2 SERPL-SCNC: 25 MMOL/L (ref 22–29)
CREAT SERPL-MCNC: 1.12 MG/DL (ref 0.73–1.18)
EOSINOPHIL # BLD AUTO: 0.1 X10(3)/MCL (ref 0–0.9)
EOSINOPHIL NFR BLD AUTO: 1.4 %
ERYTHROCYTE [DISTWIDTH] IN BLOOD BY AUTOMATED COUNT: 14.4 % (ref 11.5–17)
GLOBULIN SER-MCNC: 3 GM/DL (ref 2.4–3.5)
GLUCOSE SERPL-MCNC: 99 MG/DL (ref 74–100)
HCT VFR BLD AUTO: 39.1 % (ref 42–52)
HGB BLD-MCNC: 13.3 GM/DL (ref 14–18)
LYMPHOCYTES # BLD AUTO: 0.9 X10(3)/MCL (ref 0.6–4.6)
LYMPHOCYTES NFR BLD AUTO: 21.5 %
MCH RBC QN AUTO: 32.3 PG (ref 27–31)
MCHC RBC AUTO-ENTMCNC: 34 GM/DL (ref 33–36)
MCV RBC AUTO: 94.9 FL (ref 80–94)
MONOCYTES # BLD AUTO: 0.9 X10(3)/MCL (ref 0.1–1.3)
MONOCYTES NFR BLD AUTO: 19.9 %
NEUTROPHILS # BLD AUTO: 2.4 X10(3)/MCL (ref 2.1–9.2)
NEUTROPHILS NFR BLD AUTO: 56.3 %
PLATELET # BLD AUTO: 192 X10(3)/MCL (ref 130–400)
PMV BLD AUTO: 8.9 FL (ref 9.4–12.4)
POTASSIUM SERPL-SCNC: 4.1 MMOL/L (ref 3.5–5.1)
PROT SERPL-MCNC: 6.7 GM/DL (ref 6.4–8.3)
RBC # BLD AUTO: 4.12 X10(6)/MCL (ref 4.7–6.1)
SODIUM SERPL-SCNC: 139 MMOL/L (ref 136–145)
WBC # SPEC AUTO: 4.3 X10(3)/MCL (ref 4.5–11.5)

## 2020-04-29 ENCOUNTER — HISTORICAL (OUTPATIENT)
Dept: INFUSION THERAPY | Facility: HOSPITAL | Age: 50
End: 2020-04-29

## 2020-04-29 LAB
ABS NEUT (OLG): 3 X10(3)/MCL (ref 2.1–9.2)
ANION GAP SERPL CALC-SCNC: 14 MMOL/L
BASOPHILS # BLD AUTO: 0 X10(3)/MCL (ref 0–0.2)
BASOPHILS NFR BLD AUTO: 0.4 %
BUN SERPL-MCNC: 13 MG/DL (ref 8–26)
CHLORIDE SERPL-SCNC: 103 MMOL/L (ref 98–109)
CREAT SERPL-MCNC: 1 MG/DL (ref 0.6–1.3)
EOSINOPHIL # BLD AUTO: 0.1 X10(3)/MCL (ref 0–0.9)
EOSINOPHIL NFR BLD AUTO: 1.4 %
ERYTHROCYTE [DISTWIDTH] IN BLOOD BY AUTOMATED COUNT: 16.7 % (ref 11.5–17)
GLUCOSE SERPL-MCNC: 98 MG/DL (ref 70–105)
HCT VFR BLD AUTO: 38.5 % (ref 42–52)
HCT VFR BLD CALC: 37 % (ref 38–51)
HGB BLD-MCNC: 12.6 MG/DL (ref 12–17)
HGB BLD-MCNC: 12.7 GM/DL (ref 14–18)
LYMPHOCYTES # BLD AUTO: 0.9 X10(3)/MCL (ref 0.6–4.6)
LYMPHOCYTES NFR BLD AUTO: 18.4 %
MCH RBC QN AUTO: 32.3 PG (ref 27–31)
MCHC RBC AUTO-ENTMCNC: 33 GM/DL (ref 33–36)
MCV RBC AUTO: 98 FL (ref 80–94)
MONOCYTES # BLD AUTO: 0.9 X10(3)/MCL (ref 0.1–1.3)
MONOCYTES NFR BLD AUTO: 18 %
NEUTROPHILS # BLD AUTO: 3 X10(3)/MCL (ref 2.1–9.2)
NEUTROPHILS NFR BLD AUTO: 61.4 %
PLATELET # BLD AUTO: 221 X10(3)/MCL (ref 130–400)
PMV BLD AUTO: 8.6 FL (ref 9.4–12.4)
POC IONIZED CALCIUM: 1.24 MMOL/L (ref 1.12–1.32)
POC TCO2: 26 MMOL/L (ref 24–29)
POTASSIUM BLD-SCNC: 4 MMOL/L (ref 3.5–4.9)
RBC # BLD AUTO: 3.93 X10(6)/MCL (ref 4.7–6.1)
SODIUM BLD-SCNC: 138 MMOL/L (ref 138–146)
WBC # SPEC AUTO: 4.9 X10(3)/MCL (ref 4.5–11.5)

## 2020-05-06 ENCOUNTER — HISTORICAL (OUTPATIENT)
Dept: ADMINISTRATIVE | Facility: HOSPITAL | Age: 50
End: 2020-05-06

## 2020-05-06 LAB
ABS NEUT (OLG): 3.82 X10(3)/MCL (ref 2.1–9.2)
ALBUMIN SERPL-MCNC: 3.8 GM/DL (ref 3.5–5)
ALBUMIN/GLOB SERPL: 1.2 RATIO (ref 1.1–2)
ALP SERPL-CCNC: 83 UNIT/L (ref 40–150)
ALT SERPL-CCNC: 29 UNIT/L (ref 0–55)
AST SERPL-CCNC: 28 UNIT/L (ref 5–34)
BASOPHILS # BLD AUTO: 0 X10(3)/MCL (ref 0–0.2)
BASOPHILS NFR BLD AUTO: 0.3 %
BILIRUB SERPL-MCNC: 0.4 MG/DL
BILIRUBIN DIRECT+TOT PNL SERPL-MCNC: 0.2 MG/DL (ref 0–0.5)
BILIRUBIN DIRECT+TOT PNL SERPL-MCNC: 0.2 MG/DL (ref 0–0.8)
BUN SERPL-MCNC: 21 MG/DL (ref 8.4–25.7)
CALCIUM SERPL-MCNC: 9.7 MG/DL (ref 8.4–10.2)
CHLORIDE SERPL-SCNC: 102 MMOL/L (ref 98–107)
CO2 SERPL-SCNC: 25 MMOL/L (ref 22–29)
CREAT SERPL-MCNC: 1.16 MG/DL (ref 0.73–1.18)
EOSINOPHIL # BLD AUTO: 0 X10(3)/MCL (ref 0–0.9)
EOSINOPHIL NFR BLD AUTO: 0.6 %
ERYTHROCYTE [DISTWIDTH] IN BLOOD BY AUTOMATED COUNT: 15.7 % (ref 11.5–17)
GLOBULIN SER-MCNC: 3.1 GM/DL (ref 2.4–3.5)
GLUCOSE SERPL-MCNC: 93 MG/DL (ref 74–100)
HCT VFR BLD AUTO: 41.7 % (ref 42–52)
HGB BLD-MCNC: 14.3 GM/DL (ref 14–18)
LYMPHOCYTES # BLD AUTO: 1.3 X10(3)/MCL (ref 0.6–4.6)
LYMPHOCYTES NFR BLD AUTO: 19.4 %
MCH RBC QN AUTO: 32.1 PG (ref 27–31)
MCHC RBC AUTO-ENTMCNC: 34.3 GM/DL (ref 33–36)
MCV RBC AUTO: 93.7 FL (ref 80–94)
MONOCYTES # BLD AUTO: 1.3 X10(3)/MCL (ref 0.1–1.3)
MONOCYTES NFR BLD AUTO: 19.4 %
NEUTROPHILS # BLD AUTO: 3.8 X10(3)/MCL (ref 2.1–9.2)
NEUTROPHILS NFR BLD AUTO: 57.9 %
PLATELET # BLD AUTO: 235 X10(3)/MCL (ref 130–400)
PMV BLD AUTO: 8.7 FL (ref 9.4–12.4)
POTASSIUM SERPL-SCNC: 4.3 MMOL/L (ref 3.5–5.1)
PROT SERPL-MCNC: 6.9 GM/DL (ref 6.4–8.3)
RBC # BLD AUTO: 4.45 X10(6)/MCL (ref 4.7–6.1)
SODIUM SERPL-SCNC: 136 MMOL/L (ref 136–145)
WBC # SPEC AUTO: 6.6 X10(3)/MCL (ref 4.5–11.5)

## 2020-05-20 ENCOUNTER — HISTORICAL (OUTPATIENT)
Dept: INFUSION THERAPY | Facility: HOSPITAL | Age: 50
End: 2020-05-20

## 2020-05-20 LAB
ABS NEUT (OLG): 1.64 X10(3)/MCL (ref 2.1–9.2)
ANION GAP SERPL CALC-SCNC: 14 MMOL/L
BASOPHILS # BLD AUTO: 0 X10(3)/MCL (ref 0–0.2)
BASOPHILS NFR BLD AUTO: 0.6 %
BUN SERPL-MCNC: 17 MG/DL (ref 8–26)
CEA SERPL-MCNC: 3.24 MG/ML (ref 0–3)
CHLORIDE SERPL-SCNC: 105 MMOL/L (ref 98–109)
CREAT SERPL-MCNC: 1 MG/DL (ref 0.6–1.3)
EOSINOPHIL # BLD AUTO: 0.1 X10(3)/MCL (ref 0–0.9)
EOSINOPHIL NFR BLD AUTO: 2.5 %
ERYTHROCYTE [DISTWIDTH] IN BLOOD BY AUTOMATED COUNT: 17.7 % (ref 11.5–17)
GLUCOSE SERPL-MCNC: 107 MG/DL (ref 70–105)
HCT VFR BLD AUTO: 37.8 % (ref 42–52)
HCT VFR BLD CALC: 37 % (ref 38–51)
HGB BLD-MCNC: 12.6 MG/DL (ref 12–17)
HGB BLD-MCNC: 12.8 GM/DL (ref 14–18)
LYMPHOCYTES # BLD AUTO: 0.8 X10(3)/MCL (ref 0.6–4.6)
LYMPHOCYTES NFR BLD AUTO: 24.3 %
MCH RBC QN AUTO: 33 PG (ref 27–31)
MCHC RBC AUTO-ENTMCNC: 33.9 GM/DL (ref 33–36)
MCV RBC AUTO: 97.4 FL (ref 80–94)
MONOCYTES # BLD AUTO: 0.7 X10(3)/MCL (ref 0.1–1.3)
MONOCYTES NFR BLD AUTO: 20.9 %
NEUTROPHILS # BLD AUTO: 1.6 X10(3)/MCL (ref 2.1–9.2)
NEUTROPHILS NFR BLD AUTO: 51.1 %
PLATELET # BLD AUTO: 202 X10(3)/MCL (ref 130–400)
PMV BLD AUTO: 8.5 FL (ref 9.4–12.4)
POC IONIZED CALCIUM: 1.25 MMOL/L (ref 1.12–1.32)
POC TCO2: 25 MMOL/L (ref 24–29)
POTASSIUM BLD-SCNC: 4.2 MMOL/L (ref 3.5–4.9)
RBC # BLD AUTO: 3.88 X10(6)/MCL (ref 4.7–6.1)
SODIUM BLD-SCNC: 138 MMOL/L (ref 138–146)
WBC # SPEC AUTO: 3.2 X10(3)/MCL (ref 4.5–11.5)

## 2020-05-27 ENCOUNTER — HISTORICAL (OUTPATIENT)
Dept: ADMINISTRATIVE | Facility: HOSPITAL | Age: 50
End: 2020-05-27

## 2020-05-27 LAB
ABS NEUT (OLG): 2.82 X10(3)/MCL (ref 2.1–9.2)
ALBUMIN SERPL-MCNC: 4.1 GM/DL (ref 3.5–5)
ALBUMIN/GLOB SERPL: 1.3 RATIO (ref 1.1–2)
ALP SERPL-CCNC: 95 UNIT/L (ref 40–150)
ALT SERPL-CCNC: 33 UNIT/L (ref 0–55)
ANISOCYTOSIS BLD QL SMEAR: ABNORMAL
AST SERPL-CCNC: 30 UNIT/L (ref 5–34)
BASOPHILS # BLD AUTO: 0 X10(3)/MCL (ref 0–0.2)
BASOPHILS NFR BLD AUTO: 0.4 %
BILIRUB SERPL-MCNC: 0.6 MG/DL
BILIRUBIN DIRECT+TOT PNL SERPL-MCNC: 0.2 MG/DL (ref 0–0.5)
BILIRUBIN DIRECT+TOT PNL SERPL-MCNC: 0.4 MG/DL (ref 0–0.8)
BUN SERPL-MCNC: 15.6 MG/DL (ref 8.4–25.7)
CALCIUM SERPL-MCNC: 9.4 MG/DL (ref 8.4–10.2)
CHLORIDE SERPL-SCNC: 100 MMOL/L (ref 98–107)
CO2 SERPL-SCNC: 22 MMOL/L (ref 22–29)
CREAT SERPL-MCNC: 0.99 MG/DL (ref 0.73–1.18)
EOSINOPHIL # BLD AUTO: 0.1 X10(3)/MCL (ref 0–0.9)
EOSINOPHIL NFR BLD AUTO: 1.2 %
EOSINOPHIL NFR BLD MANUAL: 1 % (ref 0–8)
ERYTHROCYTE [DISTWIDTH] IN BLOOD BY AUTOMATED COUNT: 16.6 % (ref 11.5–17)
GLOBULIN SER-MCNC: 3.1 GM/DL (ref 2.4–3.5)
GLUCOSE SERPL-MCNC: 94 MG/DL (ref 74–100)
HCT VFR BLD AUTO: 41.6 % (ref 42–52)
HGB BLD-MCNC: 14.3 GM/DL (ref 14–18)
LYMPHOCYTES # BLD AUTO: 1.4 X10(3)/MCL (ref 0.6–4.6)
LYMPHOCYTES NFR BLD AUTO: 24.4 %
LYMPHOCYTES NFR BLD MANUAL: 25 % (ref 13–40)
MCH RBC QN AUTO: 32.7 PG (ref 27–31)
MCHC RBC AUTO-ENTMCNC: 34.4 GM/DL (ref 33–36)
MCV RBC AUTO: 95.2 FL (ref 80–94)
METAMYELOCYTES NFR BLD MANUAL: 1 %
MONOCYTES # BLD AUTO: 1.3 X10(3)/MCL (ref 0.1–1.3)
MONOCYTES NFR BLD AUTO: 22.8 %
MONOCYTES NFR BLD MANUAL: 14 % (ref 2–11)
NEUTROPHILS # BLD AUTO: 2.8 X10(3)/MCL (ref 2.1–9.2)
NEUTROPHILS NFR BLD AUTO: 49.8 %
NEUTROPHILS NFR BLD MANUAL: 59 % (ref 47–80)
PLATELET # BLD AUTO: 211 X10(3)/MCL (ref 130–400)
PLATELET # BLD EST: NORMAL 10*3/UL
PMV BLD AUTO: 8.3 FL (ref 9.4–12.4)
POTASSIUM SERPL-SCNC: 4 MMOL/L (ref 3.5–5.1)
PROT SERPL-MCNC: 7.2 GM/DL (ref 6.4–8.3)
RBC # BLD AUTO: 4.37 X10(6)/MCL (ref 4.7–6.1)
RBC MORPH BLD: ABNORMAL
SODIUM SERPL-SCNC: 134 MMOL/L (ref 136–145)
WBC # SPEC AUTO: 5.7 X10(3)/MCL (ref 4.5–11.5)

## 2020-06-10 ENCOUNTER — HISTORICAL (OUTPATIENT)
Dept: HEMATOLOGY/ONCOLOGY | Facility: CLINIC | Age: 50
End: 2020-06-10

## 2020-06-10 LAB
ABS NEUT (OLG): 2.61 X10(3)/MCL (ref 2.1–9.2)
ALBUMIN SERPL-MCNC: 3.7 GM/DL (ref 3.5–5)
ALBUMIN/GLOB SERPL: 1.3 RATIO (ref 1.1–2)
ALP SERPL-CCNC: 94 UNIT/L (ref 40–150)
ALT SERPL-CCNC: 42 UNIT/L (ref 0–55)
AST SERPL-CCNC: 39 UNIT/L (ref 5–34)
BASOPHILS # BLD AUTO: 0 X10(3)/MCL (ref 0–0.2)
BASOPHILS NFR BLD AUTO: 0.7 %
BILIRUB SERPL-MCNC: 0.5 MG/DL
BILIRUBIN DIRECT+TOT PNL SERPL-MCNC: 0.2 MG/DL (ref 0–0.5)
BILIRUBIN DIRECT+TOT PNL SERPL-MCNC: 0.3 MG/DL (ref 0–0.8)
BUN SERPL-MCNC: 17 MG/DL (ref 8.4–25.7)
CALCIUM SERPL-MCNC: 9 MG/DL (ref 8.4–10.2)
CEA SERPL-MCNC: 2.59 NG/ML (ref 0–3)
CHLORIDE SERPL-SCNC: 104 MMOL/L (ref 98–107)
CO2 SERPL-SCNC: 23 MMOL/L (ref 22–29)
CREAT SERPL-MCNC: 1.06 MG/DL (ref 0.73–1.18)
EOSINOPHIL # BLD AUTO: 0.1 X10(3)/MCL (ref 0–0.9)
EOSINOPHIL NFR BLD AUTO: 1.8 %
ERYTHROCYTE [DISTWIDTH] IN BLOOD BY AUTOMATED COUNT: 18.3 % (ref 11.5–17)
GLOBULIN SER-MCNC: 2.9 GM/DL (ref 2.4–3.5)
GLUCOSE SERPL-MCNC: 93 MG/DL (ref 74–100)
HCT VFR BLD AUTO: 35.2 % (ref 42–52)
HGB BLD-MCNC: 12.1 GM/DL (ref 14–18)
LYMPHOCYTES # BLD AUTO: 0.9 X10(3)/MCL (ref 0.6–4.6)
LYMPHOCYTES NFR BLD AUTO: 19.6 %
MCH RBC QN AUTO: 34.2 PG (ref 27–31)
MCHC RBC AUTO-ENTMCNC: 34.4 GM/DL (ref 33–36)
MCV RBC AUTO: 99.4 FL (ref 80–94)
MONOCYTES # BLD AUTO: 0.9 X10(3)/MCL (ref 0.1–1.3)
MONOCYTES NFR BLD AUTO: 19.4 %
NEUTROPHILS # BLD AUTO: 2.6 X10(3)/MCL (ref 2.1–9.2)
NEUTROPHILS NFR BLD AUTO: 58.1 %
PLATELET # BLD AUTO: 224 X10(3)/MCL (ref 130–400)
PMV BLD AUTO: 8.7 FL (ref 9.4–12.4)
POTASSIUM SERPL-SCNC: 4.2 MMOL/L (ref 3.5–5.1)
PROT SERPL-MCNC: 6.6 GM/DL (ref 6.4–8.3)
RBC # BLD AUTO: 3.54 X10(6)/MCL (ref 4.7–6.1)
SODIUM SERPL-SCNC: 137 MMOL/L (ref 136–145)
WBC # SPEC AUTO: 4.5 X10(3)/MCL (ref 4.5–11.5)

## 2020-06-24 ENCOUNTER — HISTORICAL (OUTPATIENT)
Dept: HEMATOLOGY/ONCOLOGY | Facility: CLINIC | Age: 50
End: 2020-06-24

## 2020-07-13 ENCOUNTER — HISTORICAL (OUTPATIENT)
Dept: HEMATOLOGY/ONCOLOGY | Facility: CLINIC | Age: 50
End: 2020-07-13

## 2020-07-13 LAB
ABS NEUT (OLG): 3.21 X10(3)/MCL (ref 2.1–9.2)
ALBUMIN SERPL-MCNC: 3.9 GM/DL (ref 3.5–5)
ALBUMIN/GLOB SERPL: 1.5 RATIO (ref 1.1–2)
ALP SERPL-CCNC: 82 UNIT/L (ref 40–150)
ALT SERPL-CCNC: 33 UNIT/L (ref 0–55)
ANISOCYTOSIS BLD QL SMEAR: NORMAL
AST SERPL-CCNC: 31 UNIT/L (ref 5–34)
BASOPHILS # BLD AUTO: 0 X10(3)/MCL (ref 0–0.2)
BASOPHILS NFR BLD AUTO: 0.4 %
BILIRUB SERPL-MCNC: 0.4 MG/DL
BILIRUBIN DIRECT+TOT PNL SERPL-MCNC: 0.2 MG/DL (ref 0–0.5)
BILIRUBIN DIRECT+TOT PNL SERPL-MCNC: 0.2 MG/DL (ref 0–0.8)
BUN SERPL-MCNC: 14.1 MG/DL (ref 8.4–25.7)
CALCIUM SERPL-MCNC: 9.1 MG/DL (ref 8.4–10.2)
CEA SERPL-MCNC: 1.82 NG/ML (ref 0–3)
CHLORIDE SERPL-SCNC: 103 MMOL/L (ref 98–107)
CO2 SERPL-SCNC: 25 MMOL/L (ref 22–29)
CREAT SERPL-MCNC: 1.11 MG/DL (ref 0.73–1.18)
EOSINOPHIL # BLD AUTO: 0.2 X10(3)/MCL (ref 0–0.9)
EOSINOPHIL NFR BLD AUTO: 3.7 %
ERYTHROCYTE [DISTWIDTH] IN BLOOD BY AUTOMATED COUNT: 14.2 % (ref 11.5–17)
GLOBULIN SER-MCNC: 2.6 GM/DL (ref 2.4–3.5)
GLUCOSE SERPL-MCNC: 90 MG/DL (ref 74–100)
HCT VFR BLD AUTO: 35.2 % (ref 42–52)
HGB BLD-MCNC: 11.7 GM/DL (ref 14–18)
LYMPHOCYTES # BLD AUTO: 0.9 X10(3)/MCL (ref 0.6–4.6)
LYMPHOCYTES NFR BLD AUTO: 18.1 %
MACROCYTES BLD QL SMEAR: NORMAL
MCH RBC QN AUTO: 35.1 PG (ref 27–31)
MCHC RBC AUTO-ENTMCNC: 33.2 GM/DL (ref 33–36)
MCV RBC AUTO: 105.7 FL (ref 80–94)
MONOCYTES # BLD AUTO: 0.6 X10(3)/MCL (ref 0.1–1.3)
MONOCYTES NFR BLD AUTO: 11.8 %
NEUTROPHILS # BLD AUTO: 3.2 X10(3)/MCL (ref 2.1–9.2)
NEUTROPHILS NFR BLD AUTO: 65 %
PLATELET # BLD AUTO: 249 X10(3)/MCL (ref 130–400)
PLATELET # BLD EST: NORMAL 10*3/UL
PMV BLD AUTO: 8.6 FL (ref 9.4–12.4)
POC CREATININE: 1.1 MG/DL (ref 0.6–1.3)
POTASSIUM SERPL-SCNC: 4 MMOL/L (ref 3.5–5.1)
PROT SERPL-MCNC: 6.5 GM/DL (ref 6.4–8.3)
RBC # BLD AUTO: 3.33 X10(6)/MCL (ref 4.7–6.1)
RBC MORPH BLD: NORMAL
SODIUM SERPL-SCNC: 138 MMOL/L (ref 136–145)
WBC # SPEC AUTO: 4.9 X10(3)/MCL (ref 4.5–11.5)

## 2020-07-24 ENCOUNTER — HISTORICAL (OUTPATIENT)
Dept: INFUSION THERAPY | Facility: HOSPITAL | Age: 50
End: 2020-07-24

## 2020-09-18 ENCOUNTER — HISTORICAL (OUTPATIENT)
Dept: INFUSION THERAPY | Facility: HOSPITAL | Age: 50
End: 2020-09-18

## 2020-10-26 ENCOUNTER — HISTORICAL (OUTPATIENT)
Dept: ADMINISTRATIVE | Facility: HOSPITAL | Age: 50
End: 2020-10-26

## 2020-10-26 LAB
ABS NEUT (OLG): 2.72 X10(3)/MCL (ref 2.1–9.2)
ALBUMIN SERPL-MCNC: 4.4 GM/DL (ref 3.5–5)
ALBUMIN/GLOB SERPL: 1.5 RATIO (ref 1.1–2)
ALP SERPL-CCNC: 79 UNIT/L (ref 40–150)
ALT SERPL-CCNC: 33 UNIT/L (ref 0–55)
AST SERPL-CCNC: 25 UNIT/L (ref 5–34)
BASOPHILS # BLD AUTO: 0 X10(3)/MCL (ref 0–0.2)
BASOPHILS NFR BLD AUTO: 0.4 %
BILIRUB SERPL-MCNC: 0.6 MG/DL
BILIRUBIN DIRECT+TOT PNL SERPL-MCNC: 0.2 MG/DL (ref 0–0.5)
BILIRUBIN DIRECT+TOT PNL SERPL-MCNC: 0.4 MG/DL (ref 0–0.8)
BUN SERPL-MCNC: 18.6 MG/DL (ref 8.4–25.7)
CALCIUM SERPL-MCNC: 9.8 MG/DL (ref 8.4–10.2)
CEA SERPL-MCNC: 2.03 NG/ML (ref 0–3)
CHLORIDE SERPL-SCNC: 98 MMOL/L (ref 98–107)
CO2 SERPL-SCNC: 29 MMOL/L (ref 22–29)
CREAT SERPL-MCNC: 1.07 MG/DL (ref 0.73–1.18)
EOSINOPHIL # BLD AUTO: 0.2 X10(3)/MCL (ref 0–0.9)
EOSINOPHIL NFR BLD AUTO: 4.4 %
ERYTHROCYTE [DISTWIDTH] IN BLOOD BY AUTOMATED COUNT: 12.6 % (ref 11.5–17)
FERRITIN SERPL-MCNC: 244.96 NG/ML (ref 21.81–274.66)
FOLATE SERPL-MCNC: 18.1 NG/ML (ref 7–31.4)
GLOBULIN SER-MCNC: 2.9 GM/DL (ref 2.4–3.5)
GLUCOSE SERPL-MCNC: 88 MG/DL (ref 74–100)
HCT VFR BLD AUTO: 40.5 % (ref 42–52)
HGB BLD-MCNC: 13.6 GM/DL (ref 14–18)
IRON SATN MFR SERPL: 44 % (ref 20–50)
IRON SERPL-MCNC: 127 UG/DL (ref 65–175)
LYMPHOCYTES # BLD AUTO: 1.2 X10(3)/MCL (ref 0.6–4.6)
LYMPHOCYTES NFR BLD AUTO: 24.9 %
MCH RBC QN AUTO: 31.6 PG (ref 27–31)
MCHC RBC AUTO-ENTMCNC: 33.6 GM/DL (ref 33–36)
MCV RBC AUTO: 94 FL (ref 80–94)
MONOCYTES # BLD AUTO: 0.6 X10(3)/MCL (ref 0.1–1.3)
MONOCYTES NFR BLD AUTO: 12.1 %
NEUTROPHILS # BLD AUTO: 2.7 X10(3)/MCL (ref 2.1–9.2)
NEUTROPHILS NFR BLD AUTO: 57.6 %
PLATELET # BLD AUTO: 277 X10(3)/MCL (ref 130–400)
PMV BLD AUTO: 8.6 FL (ref 9.4–12.4)
POTASSIUM SERPL-SCNC: 4.2 MMOL/L (ref 3.5–5.1)
PROT SERPL-MCNC: 7.3 GM/DL (ref 6.4–8.3)
RBC # BLD AUTO: 4.31 X10(6)/MCL (ref 4.7–6.1)
SODIUM SERPL-SCNC: 137 MMOL/L (ref 136–145)
TIBC SERPL-MCNC: 162 UG/DL (ref 69–240)
TIBC SERPL-MCNC: 289 UG/DL (ref 250–450)
TRANSFERRIN SERPL-MCNC: 252 MG/DL (ref 174–364)
VIT B12 SERPL-MCNC: 471 PG/ML (ref 213–816)
WBC # SPEC AUTO: 4.7 X10(3)/MCL (ref 4.5–11.5)

## 2020-11-13 ENCOUNTER — HISTORICAL (OUTPATIENT)
Dept: INFUSION THERAPY | Facility: HOSPITAL | Age: 50
End: 2020-11-13

## 2021-01-07 DIAGNOSIS — Z85.038 PERSONAL HISTORY OF COLON CANCER: Primary | ICD-10-CM

## 2021-01-08 ENCOUNTER — HISTORICAL (OUTPATIENT)
Dept: INFUSION THERAPY | Facility: HOSPITAL | Age: 51
End: 2021-01-08

## 2021-01-18 DIAGNOSIS — Z85.038 PERSONAL HISTORY OF COLON CANCER: Primary | ICD-10-CM

## 2021-01-18 RX ORDER — LIDOCAINE HYDROCHLORIDE 10 MG/ML
1 INJECTION, SOLUTION EPIDURAL; INFILTRATION; INTRACAUDAL; PERINEURAL ONCE
Status: CANCELLED | OUTPATIENT
Start: 2021-01-18 | End: 2021-01-18

## 2021-01-18 RX ORDER — SODIUM CHLORIDE 9 MG/ML
INJECTION, SOLUTION INTRAVENOUS CONTINUOUS
Status: CANCELLED | OUTPATIENT
Start: 2021-01-25

## 2021-01-18 RX ORDER — SODIUM CHLORIDE 0.9 % (FLUSH) 0.9 %
10 SYRINGE (ML) INJECTION
Status: CANCELLED | OUTPATIENT
Start: 2021-01-18

## 2021-01-19 ENCOUNTER — HOSPITAL ENCOUNTER (OUTPATIENT)
Dept: PULMONOLOGY | Facility: HOSPITAL | Age: 51
Discharge: HOME | End: 2021-01-19
Attending: SURGERY
Payer: COMMERCIAL

## 2021-01-19 ENCOUNTER — LAB VISIT (OUTPATIENT)
Dept: LAB | Facility: HOSPITAL | Age: 51
End: 2021-01-19
Attending: SURGERY
Payer: COMMERCIAL

## 2021-01-19 ENCOUNTER — HOSPITAL ENCOUNTER (OUTPATIENT)
Dept: PREADMISSION TESTING | Facility: HOSPITAL | Age: 51
Discharge: HOME | End: 2021-01-19
Attending: SURGERY
Payer: COMMERCIAL

## 2021-01-19 VITALS — HEIGHT: 69 IN | WEIGHT: 201 LBS | BODY MASS INDEX: 29.77 KG/M2

## 2021-01-19 DIAGNOSIS — Z85.038 PERSONAL HISTORY OF COLON CANCER: ICD-10-CM

## 2021-01-19 DIAGNOSIS — Z20.822 ENCOUNTER FOR LABORATORY TESTING FOR COVID-19 VIRUS: ICD-10-CM

## 2021-01-19 DIAGNOSIS — Z01.818 PRE-OP TESTING: ICD-10-CM

## 2021-01-19 LAB
ALBUMIN SERPL BCP-MCNC: 4 G/DL (ref 3.5–5.2)
ALP SERPL-CCNC: 74 U/L (ref 55–135)
ALT SERPL W/O P-5'-P-CCNC: 44 U/L (ref 10–44)
ANION GAP SERPL CALC-SCNC: 1 MMOL/L (ref 8–16)
AST SERPL-CCNC: 20 U/L (ref 10–40)
BASOPHILS # BLD AUTO: 0.03 K/UL (ref 0–0.2)
BASOPHILS NFR BLD: 0.7 % (ref 0–1.9)
BILIRUB SERPL-MCNC: 0.4 MG/DL (ref 0.1–1)
BILIRUB UR QL STRIP: NEGATIVE
BUN SERPL-MCNC: 18 MG/DL (ref 6–20)
CALCIUM SERPL-MCNC: 9.9 MG/DL (ref 8.7–10.5)
CHLORIDE SERPL-SCNC: 105 MMOL/L (ref 95–110)
CLARITY UR: CLEAR
CO2 SERPL-SCNC: 32 MMOL/L (ref 23–29)
COLOR UR: YELLOW
CREAT SERPL-MCNC: 1.2 MG/DL (ref 0.5–1.4)
DIFFERENTIAL METHOD: ABNORMAL
EOSINOPHIL # BLD AUTO: 0.2 K/UL (ref 0–0.5)
EOSINOPHIL NFR BLD: 3.8 % (ref 0–8)
ERYTHROCYTE [DISTWIDTH] IN BLOOD BY AUTOMATED COUNT: 13 % (ref 11.5–14.5)
EST. GFR  (AFRICAN AMERICAN): >60 ML/MIN/1.73 M^2
EST. GFR  (NON AFRICAN AMERICAN): >60 ML/MIN/1.73 M^2
GLUCOSE SERPL-MCNC: 104 MG/DL (ref 70–110)
GLUCOSE UR QL STRIP: NEGATIVE
HCT VFR BLD AUTO: 40.5 % (ref 40–54)
HGB BLD-MCNC: 13.8 G/DL (ref 14–18)
HGB UR QL STRIP: NEGATIVE
IMM GRANULOCYTES # BLD AUTO: 0.02 K/UL (ref 0–0.04)
IMM GRANULOCYTES NFR BLD AUTO: 0.4 % (ref 0–0.5)
KETONES UR QL STRIP: ABNORMAL
LEUKOCYTE ESTERASE UR QL STRIP: NEGATIVE
LYMPHOCYTES # BLD AUTO: 0.9 K/UL (ref 1–4.8)
LYMPHOCYTES NFR BLD: 20.9 % (ref 18–48)
MCH RBC QN AUTO: 31.3 PG (ref 27–31)
MCHC RBC AUTO-ENTMCNC: 34.1 G/DL (ref 32–36)
MCV RBC AUTO: 92 FL (ref 82–98)
MONOCYTES # BLD AUTO: 0.4 K/UL (ref 0.3–1)
MONOCYTES NFR BLD: 9.8 % (ref 4–15)
NEUTROPHILS # BLD AUTO: 2.9 K/UL (ref 1.8–7.7)
NEUTROPHILS NFR BLD: 64.4 % (ref 38–73)
NITRITE UR QL STRIP: NEGATIVE
NRBC BLD-RTO: 0 /100 WBC
PH UR STRIP: 7 [PH] (ref 5–8)
PLATELET # BLD AUTO: 278 K/UL (ref 150–350)
PMV BLD AUTO: 9 FL (ref 9.2–12.9)
POTASSIUM SERPL-SCNC: 4.1 MMOL/L (ref 3.5–5.1)
PROT SERPL-MCNC: 7.4 G/DL (ref 6–8.4)
PROT UR QL STRIP: NEGATIVE
RBC # BLD AUTO: 4.41 M/UL (ref 4.6–6.2)
SODIUM SERPL-SCNC: 138 MMOL/L (ref 136–145)
SP GR UR STRIP: 1.02 (ref 1–1.03)
URN SPEC COLLECT METH UR: ABNORMAL
UROBILINOGEN UR STRIP-ACNC: 1 EU/DL
WBC # BLD AUTO: 4.5 K/UL (ref 3.9–12.7)

## 2021-01-19 PROCEDURE — 85025 COMPLETE CBC W/AUTO DIFF WBC: CPT

## 2021-01-19 PROCEDURE — 93010 EKG 12-LEAD: ICD-10-PCS | Mod: ,,, | Performed by: INTERNAL MEDICINE

## 2021-01-19 PROCEDURE — 80053 COMPREHEN METABOLIC PANEL: CPT

## 2021-01-19 PROCEDURE — 93010 ELECTROCARDIOGRAM REPORT: CPT | Mod: ,,, | Performed by: INTERNAL MEDICINE

## 2021-01-19 PROCEDURE — 36415 COLL VENOUS BLD VENIPUNCTURE: CPT

## 2021-01-19 PROCEDURE — 93005 ELECTROCARDIOGRAM TRACING: CPT

## 2021-01-19 PROCEDURE — 81003 URINALYSIS AUTO W/O SCOPE: CPT

## 2021-01-19 RX ORDER — ASPIRIN 325 MG
50 TABLET, DELAYED RELEASE (ENTERIC COATED) ORAL DAILY
COMMUNITY

## 2021-01-19 RX ORDER — LISINOPRIL AND HYDROCHLOROTHIAZIDE 12.5; 2 MG/1; MG/1
1 TABLET ORAL EVERY MORNING
COMMUNITY
Start: 2021-01-04

## 2021-01-19 RX ORDER — FLUOXETINE 20 MG/1
TABLET ORAL
COMMUNITY
Start: 2020-12-13

## 2021-01-19 RX ORDER — AMOXICILLIN 500 MG
1 CAPSULE ORAL DAILY
COMMUNITY

## 2021-01-21 ENCOUNTER — ANESTHESIA EVENT (OUTPATIENT)
Dept: ENDOSCOPY | Facility: HOSPITAL | Age: 51
End: 2021-01-21
Payer: COMMERCIAL

## 2021-01-22 ENCOUNTER — HOSPITAL ENCOUNTER (OUTPATIENT)
Dept: PREADMISSION TESTING | Facility: HOSPITAL | Age: 51
Discharge: HOME | End: 2021-01-22
Attending: SURGERY
Payer: COMMERCIAL

## 2021-01-22 ENCOUNTER — HISTORICAL (OUTPATIENT)
Dept: HEMATOLOGY/ONCOLOGY | Facility: CLINIC | Age: 51
End: 2021-01-22

## 2021-01-22 DIAGNOSIS — Z01.818 PRE-OP TESTING: ICD-10-CM

## 2021-01-22 DIAGNOSIS — Z20.822 ENCOUNTER FOR LABORATORY TESTING FOR COVID-19 VIRUS: ICD-10-CM

## 2021-01-22 LAB
ABS NEUT (OLG): 3.32 X10(3)/MCL (ref 2.1–9.2)
ALBUMIN SERPL-MCNC: 4.4 GM/DL (ref 3.5–5)
ALBUMIN/GLOB SERPL: 1.6 RATIO (ref 1.1–2)
ALP SERPL-CCNC: 73 UNIT/L (ref 40–150)
ALT SERPL-CCNC: 32 UNIT/L (ref 0–55)
AST SERPL-CCNC: 23 UNIT/L (ref 5–34)
BASOPHILS # BLD AUTO: 0 X10(3)/MCL (ref 0–0.2)
BASOPHILS NFR BLD AUTO: 0.6 %
BILIRUB SERPL-MCNC: 0.4 MG/DL
BILIRUBIN DIRECT+TOT PNL SERPL-MCNC: 0.1 MG/DL (ref 0–0.5)
BILIRUBIN DIRECT+TOT PNL SERPL-MCNC: 0.3 MG/DL (ref 0–0.8)
BUN SERPL-MCNC: 18.9 MG/DL (ref 8.4–25.7)
CALCIUM SERPL-MCNC: 9.4 MG/DL (ref 8.4–10.2)
CEA SERPL-MCNC: 1.88 NG/ML (ref 0–3)
CHLORIDE SERPL-SCNC: 104 MMOL/L (ref 98–107)
CO2 SERPL-SCNC: 27 MMOL/L (ref 22–29)
CREAT SERPL-MCNC: 1.2 MG/DL (ref 0.73–1.18)
EOSINOPHIL # BLD AUTO: 0.1 X10(3)/MCL (ref 0–0.9)
EOSINOPHIL NFR BLD AUTO: 2 %
ERYTHROCYTE [DISTWIDTH] IN BLOOD BY AUTOMATED COUNT: 12.9 % (ref 11.5–17)
GLOBULIN SER-MCNC: 2.8 GM/DL (ref 2.4–3.5)
GLUCOSE SERPL-MCNC: 90 MG/DL (ref 74–100)
HCT VFR BLD AUTO: 39.5 % (ref 42–52)
HGB BLD-MCNC: 13.3 GM/DL (ref 14–18)
LYMPHOCYTES # BLD AUTO: 1.1 X10(3)/MCL (ref 0.6–4.6)
LYMPHOCYTES NFR BLD AUTO: 22 %
MCH RBC QN AUTO: 31.8 PG (ref 27–31)
MCHC RBC AUTO-ENTMCNC: 33.7 GM/DL (ref 33–36)
MCV RBC AUTO: 94.5 FL (ref 80–94)
MONOCYTES # BLD AUTO: 0.5 X10(3)/MCL (ref 0.1–1.3)
MONOCYTES NFR BLD AUTO: 9.6 %
NEUTROPHILS # BLD AUTO: 3.3 X10(3)/MCL (ref 2.1–9.2)
NEUTROPHILS NFR BLD AUTO: 65.4 %
PLATELET # BLD AUTO: 251 X10(3)/MCL (ref 130–400)
PMV BLD AUTO: 8.5 FL (ref 9.4–12.4)
POC CREATININE: 1.4 MG/DL (ref 0.6–1.3)
POTASSIUM SERPL-SCNC: 4.3 MMOL/L (ref 3.5–5.1)
PROT SERPL-MCNC: 7.2 GM/DL (ref 6.4–8.3)
RBC # BLD AUTO: 4.18 X10(6)/MCL (ref 4.7–6.1)
SARS-COV-2 RNA RESP QL NAA+PROBE: NOT DETECTED
SODIUM SERPL-SCNC: 141 MMOL/L (ref 136–145)
WBC # SPEC AUTO: 5.1 X10(3)/MCL (ref 4.5–11.5)

## 2021-01-22 PROCEDURE — U0002 COVID-19 LAB TEST NON-CDC: HCPCS

## 2021-01-25 ENCOUNTER — HOSPITAL ENCOUNTER (OUTPATIENT)
Facility: HOSPITAL | Age: 51
Discharge: HOME | End: 2021-01-25
Attending: SURGERY | Admitting: SURGERY
Payer: COMMERCIAL

## 2021-01-25 ENCOUNTER — ANESTHESIA (OUTPATIENT)
Dept: ENDOSCOPY | Facility: HOSPITAL | Age: 51
End: 2021-01-25
Payer: COMMERCIAL

## 2021-01-25 VITALS
HEART RATE: 58 BPM | TEMPERATURE: 98 F | SYSTOLIC BLOOD PRESSURE: 128 MMHG | DIASTOLIC BLOOD PRESSURE: 60 MMHG | OXYGEN SATURATION: 98 % | RESPIRATION RATE: 18 BRPM

## 2021-01-25 DIAGNOSIS — Z85.038 PERSONAL HISTORY OF COLON CANCER: ICD-10-CM

## 2021-01-25 DIAGNOSIS — Z85.038 PERSONAL HISTORY OF COLON CANCER, STAGE II: Primary | ICD-10-CM

## 2021-01-25 LAB — CRC RECOMMENDATION EXT: NORMAL

## 2021-01-25 PROCEDURE — 25000003 PHARM REV CODE 250: Performed by: SURGERY

## 2021-01-25 PROCEDURE — 63600175 PHARM REV CODE 636 W HCPCS: Performed by: SURGERY

## 2021-01-25 PROCEDURE — 37000009 HC ANESTHESIA EA ADD 15 MINS: Performed by: SURGERY

## 2021-01-25 PROCEDURE — 45378 DIAGNOSTIC COLONOSCOPY: CPT | Performed by: SURGERY

## 2021-01-25 PROCEDURE — 37000008 HC ANESTHESIA 1ST 15 MINUTES: Performed by: SURGERY

## 2021-01-25 RX ORDER — PROPOFOL 10 MG/ML
VIAL (ML) INTRAVENOUS
Status: DISCONTINUED | OUTPATIENT
Start: 2021-01-25 | End: 2021-01-25

## 2021-01-25 RX ORDER — LIDOCAINE HYDROCHLORIDE 10 MG/ML
1 INJECTION, SOLUTION EPIDURAL; INFILTRATION; INTRACAUDAL; PERINEURAL ONCE
Status: DISCONTINUED | OUTPATIENT
Start: 2021-01-25 | End: 2021-01-25 | Stop reason: HOSPADM

## 2021-01-25 RX ORDER — HEPARIN 100 UNIT/ML
5 SYRINGE INTRAVENOUS ONCE
Status: COMPLETED | OUTPATIENT
Start: 2021-01-25 | End: 2021-01-25

## 2021-01-25 RX ORDER — SODIUM CHLORIDE 0.9 % (FLUSH) 0.9 %
10 SYRINGE (ML) INJECTION
Status: DISCONTINUED | OUTPATIENT
Start: 2021-01-25 | End: 2021-01-25 | Stop reason: HOSPADM

## 2021-01-25 RX ORDER — DEXTROMETHORPHAN/PSEUDOEPHED 2.5-7.5/.8
DROPS ORAL
Status: COMPLETED | OUTPATIENT
Start: 2021-01-25 | End: 2021-01-25

## 2021-01-25 RX ORDER — SODIUM CHLORIDE 9 MG/ML
INJECTION, SOLUTION INTRAVENOUS CONTINUOUS
Status: DISCONTINUED | OUTPATIENT
Start: 2021-01-25 | End: 2021-01-25 | Stop reason: HOSPADM

## 2021-01-25 RX ADMIN — Medication 50 MG: at 12:01

## 2021-01-25 RX ADMIN — SODIUM CHLORIDE: 0.9 INJECTION, SOLUTION INTRAVENOUS at 09:01

## 2021-01-25 RX ADMIN — SIMETHICONE 40 MG: 20 SUSPENSION/ DROPS ORAL at 12:01

## 2021-01-25 RX ADMIN — HEPARIN 500 UNITS: 100 SYRINGE at 02:01

## 2021-03-05 ENCOUNTER — HISTORICAL (OUTPATIENT)
Dept: INFUSION THERAPY | Facility: HOSPITAL | Age: 51
End: 2021-03-05

## 2021-04-26 ENCOUNTER — HISTORICAL (OUTPATIENT)
Dept: INFUSION THERAPY | Facility: HOSPITAL | Age: 51
End: 2021-04-26

## 2021-04-26 LAB
ABS NEUT (OLG): 2.57 X10(3)/MCL (ref 2.1–9.2)
ALBUMIN SERPL-MCNC: 4 GM/DL (ref 3.5–5)
ALBUMIN/GLOB SERPL: 1.6 RATIO (ref 1.1–2)
ALP SERPL-CCNC: 74 UNIT/L (ref 40–150)
ALT SERPL-CCNC: 62 UNIT/L (ref 0–55)
AST SERPL-CCNC: 33 UNIT/L (ref 5–34)
BASOPHILS # BLD AUTO: 0 X10(3)/MCL (ref 0–0.2)
BASOPHILS NFR BLD AUTO: 0.5 %
BILIRUB SERPL-MCNC: 0.5 MG/DL
BILIRUBIN DIRECT+TOT PNL SERPL-MCNC: 0.2 MG/DL (ref 0–0.5)
BILIRUBIN DIRECT+TOT PNL SERPL-MCNC: 0.3 MG/DL (ref 0–0.8)
BUN SERPL-MCNC: 13.3 MG/DL (ref 8.4–25.7)
CALCIUM SERPL-MCNC: 9.9 MG/DL (ref 8.4–10.2)
CEA SERPL-MCNC: 2.13 NG/ML (ref 0–3)
CHLORIDE SERPL-SCNC: 104 MMOL/L (ref 98–107)
CO2 SERPL-SCNC: 27 MMOL/L (ref 22–29)
CREAT SERPL-MCNC: 1.03 MG/DL (ref 0.73–1.18)
EOSINOPHIL # BLD AUTO: 0.1 X10(3)/MCL (ref 0–0.9)
EOSINOPHIL NFR BLD AUTO: 2.6 %
ERYTHROCYTE [DISTWIDTH] IN BLOOD BY AUTOMATED COUNT: 12.4 % (ref 11.5–17)
FOLATE SERPL-MCNC: 13.8 NG/ML (ref 7–31.4)
GLOBULIN SER-MCNC: 2.5 GM/DL (ref 2.4–3.5)
GLUCOSE SERPL-MCNC: 108 MG/DL (ref 74–100)
HCT VFR BLD AUTO: 38 % (ref 42–52)
HGB BLD-MCNC: 12.8 GM/DL (ref 14–18)
LYMPHOCYTES # BLD AUTO: 1 X10(3)/MCL (ref 0.6–4.6)
LYMPHOCYTES NFR BLD AUTO: 23.3 %
MCH RBC QN AUTO: 31.7 PG (ref 27–31)
MCHC RBC AUTO-ENTMCNC: 33.7 GM/DL (ref 33–36)
MCV RBC AUTO: 94.1 FL (ref 80–94)
MONOCYTES # BLD AUTO: 0.5 X10(3)/MCL (ref 0.1–1.3)
MONOCYTES NFR BLD AUTO: 11.3 %
NEUTROPHILS # BLD AUTO: 2.6 X10(3)/MCL (ref 2.1–9.2)
NEUTROPHILS NFR BLD AUTO: 61.8 %
PLATELET # BLD AUTO: 236 X10(3)/MCL (ref 130–400)
PMV BLD AUTO: 8.9 FL (ref 9.4–12.4)
POTASSIUM SERPL-SCNC: 4.2 MMOL/L (ref 3.5–5.1)
PROT SERPL-MCNC: 6.5 GM/DL (ref 6.4–8.3)
RBC # BLD AUTO: 4.04 X10(6)/MCL (ref 4.7–6.1)
RET# (OHS): 0.07 X10^6/ML (ref 0.03–0.1)
RETICULOCYTE COUNT AUTOMATED (OLG): 1.6 % (ref 1.1–2.1)
SODIUM SERPL-SCNC: 139 MMOL/L (ref 136–145)
VIT B12 SERPL-MCNC: 357 PG/ML (ref 213–816)
WBC # SPEC AUTO: 4.2 X10(3)/MCL (ref 4.5–11.5)

## 2021-06-25 ENCOUNTER — HISTORICAL (OUTPATIENT)
Dept: INFUSION THERAPY | Facility: HOSPITAL | Age: 51
End: 2021-06-25

## 2021-07-23 ENCOUNTER — HISTORICAL (OUTPATIENT)
Dept: HEMATOLOGY/ONCOLOGY | Facility: CLINIC | Age: 51
End: 2021-07-23

## 2021-07-23 LAB
ABS NEUT (OLG): 3.63 X10(3)/MCL (ref 2.1–9.2)
ALBUMIN SERPL-MCNC: 3.9 GM/DL (ref 3.5–5)
ALBUMIN/GLOB SERPL: 1.3 RATIO (ref 1.1–2)
ALP SERPL-CCNC: 73 UNIT/L (ref 40–150)
ALT SERPL-CCNC: 47 UNIT/L (ref 0–55)
AST SERPL-CCNC: 27 UNIT/L (ref 5–34)
BASOPHILS # BLD AUTO: 0 X10(3)/MCL (ref 0–0.2)
BASOPHILS NFR BLD AUTO: 0.5 %
BILIRUB SERPL-MCNC: 0.3 MG/DL
BILIRUBIN DIRECT+TOT PNL SERPL-MCNC: 0.1 MG/DL (ref 0–0.5)
BILIRUBIN DIRECT+TOT PNL SERPL-MCNC: 0.2 MG/DL (ref 0–0.8)
BUN SERPL-MCNC: 16.7 MG/DL (ref 8.4–25.7)
CALCIUM SERPL-MCNC: 10.2 MG/DL (ref 8.4–10.2)
CEA SERPL-MCNC: 1.76 NG/ML (ref 0–3)
CHLORIDE SERPL-SCNC: 100 MMOL/L (ref 98–107)
CO2 SERPL-SCNC: 27 MMOL/L (ref 22–29)
CREAT SERPL-MCNC: 1.17 MG/DL (ref 0.73–1.18)
EOSINOPHIL # BLD AUTO: 0.1 X10(3)/MCL (ref 0–0.9)
EOSINOPHIL NFR BLD AUTO: 2.4 %
ERYTHROCYTE [DISTWIDTH] IN BLOOD BY AUTOMATED COUNT: 12.7 % (ref 11.5–17)
GLOBULIN SER-MCNC: 3 GM/DL (ref 2.4–3.5)
GLUCOSE SERPL-MCNC: 84 MG/DL (ref 74–100)
HCT VFR BLD AUTO: 39 % (ref 42–52)
HGB BLD-MCNC: 13.2 GM/DL (ref 14–18)
LYMPHOCYTES # BLD AUTO: 1.4 X10(3)/MCL (ref 0.6–4.6)
LYMPHOCYTES NFR BLD AUTO: 23.6 %
MCH RBC QN AUTO: 32 PG (ref 27–31)
MCHC RBC AUTO-ENTMCNC: 33.8 GM/DL (ref 33–36)
MCV RBC AUTO: 94.4 FL (ref 80–94)
MONOCYTES # BLD AUTO: 0.6 X10(3)/MCL (ref 0.1–1.3)
MONOCYTES NFR BLD AUTO: 10.5 %
NEUTROPHILS # BLD AUTO: 3.6 X10(3)/MCL (ref 2.1–9.2)
NEUTROPHILS NFR BLD AUTO: 62.5 %
PLATELET # BLD AUTO: 256 X10(3)/MCL (ref 130–400)
PMV BLD AUTO: 8.6 FL (ref 9.4–12.4)
POC CREATININE: 1.3 MG/DL (ref 0.6–1.3)
POTASSIUM SERPL-SCNC: 4.1 MMOL/L (ref 3.5–5.1)
PROT SERPL-MCNC: 6.9 GM/DL (ref 6.4–8.3)
RBC # BLD AUTO: 4.13 X10(6)/MCL (ref 4.7–6.1)
SODIUM SERPL-SCNC: 138 MMOL/L (ref 136–145)
WBC # SPEC AUTO: 5.8 X10(3)/MCL (ref 4.5–11.5)

## 2021-08-20 ENCOUNTER — HISTORICAL (OUTPATIENT)
Dept: INFUSION THERAPY | Facility: HOSPITAL | Age: 51
End: 2021-08-20

## 2021-10-21 ENCOUNTER — HISTORICAL (OUTPATIENT)
Dept: HEMATOLOGY/ONCOLOGY | Facility: CLINIC | Age: 51
End: 2021-10-21

## 2021-10-21 LAB
ABS NEUT (OLG): 3.84 X10(3)/MCL (ref 2.1–9.2)
ALBUMIN SERPL-MCNC: 4.2 GM/DL (ref 3.5–5)
ALBUMIN/GLOB SERPL: 1.6 RATIO (ref 1.1–2)
ALP SERPL-CCNC: 80 UNIT/L (ref 40–150)
ALT SERPL-CCNC: 74 UNIT/L (ref 0–55)
AST SERPL-CCNC: 37 UNIT/L (ref 5–34)
BASOPHILS # BLD AUTO: 0 X10(3)/MCL (ref 0–0.2)
BASOPHILS NFR BLD AUTO: 0.7 %
BILIRUB SERPL-MCNC: 0.4 MG/DL
BILIRUBIN DIRECT+TOT PNL SERPL-MCNC: 0.2 MG/DL (ref 0–0.5)
BILIRUBIN DIRECT+TOT PNL SERPL-MCNC: 0.2 MG/DL (ref 0–0.8)
BUN SERPL-MCNC: 13.9 MG/DL (ref 8.4–25.7)
CALCIUM SERPL-MCNC: 9.9 MG/DL (ref 8.7–10.5)
CEA SERPL-MCNC: 1.82 NG/ML (ref 0–3)
CHLORIDE SERPL-SCNC: 106 MMOL/L (ref 98–107)
CO2 SERPL-SCNC: 24 MMOL/L (ref 22–29)
CREAT SERPL-MCNC: 1.29 MG/DL (ref 0.73–1.18)
EOSINOPHIL # BLD AUTO: 0.1 X10(3)/MCL (ref 0–0.9)
EOSINOPHIL NFR BLD AUTO: 1.7 %
ERYTHROCYTE [DISTWIDTH] IN BLOOD BY AUTOMATED COUNT: 12.9 % (ref 11.5–17)
FERRITIN SERPL-MCNC: 358.26 NG/ML (ref 21.81–274.66)
GLOBULIN SER-MCNC: 2.7 GM/DL (ref 2.4–3.5)
GLUCOSE SERPL-MCNC: 84 MG/DL (ref 74–100)
HCT VFR BLD AUTO: 40.5 % (ref 42–52)
HGB BLD-MCNC: 13.5 GM/DL (ref 14–18)
IRON SATN MFR SERPL: 32 % (ref 20–50)
IRON SERPL-MCNC: 87 UG/DL (ref 65–175)
LYMPHOCYTES # BLD AUTO: 1.4 X10(3)/MCL (ref 0.6–4.6)
LYMPHOCYTES NFR BLD AUTO: 22.7 %
MCH RBC QN AUTO: 31.5 PG (ref 27–31)
MCHC RBC AUTO-ENTMCNC: 33.3 GM/DL (ref 33–36)
MCV RBC AUTO: 94.6 FL (ref 80–94)
MONOCYTES # BLD AUTO: 0.6 X10(3)/MCL (ref 0.1–1.3)
MONOCYTES NFR BLD AUTO: 9.9 %
NEUTROPHILS # BLD AUTO: 3.8 X10(3)/MCL (ref 2.1–9.2)
NEUTROPHILS NFR BLD AUTO: 64.7 %
PLATELET # BLD AUTO: 275 X10(3)/MCL (ref 130–400)
PMV BLD AUTO: 8.7 FL (ref 9.4–12.4)
POTASSIUM SERPL-SCNC: 3.9 MMOL/L (ref 3.5–5.1)
PROT SERPL-MCNC: 6.9 GM/DL (ref 6.4–8.3)
RBC # BLD AUTO: 4.28 X10(6)/MCL (ref 4.7–6.1)
SODIUM SERPL-SCNC: 141 MMOL/L (ref 136–145)
TIBC SERPL-MCNC: 184 UG/DL (ref 69–240)
TIBC SERPL-MCNC: 271 UG/DL (ref 250–450)
TRANSFERRIN SERPL-MCNC: 240 MG/DL (ref 174–364)
VIT B12 SERPL-MCNC: >2000 PG/ML (ref 213–816)
WBC # SPEC AUTO: 5.9 X10(3)/MCL (ref 4.5–11.5)

## 2022-01-11 DIAGNOSIS — Z85.038 PERSONAL HISTORY OF COLON CANCER, STAGE II: Primary | ICD-10-CM

## 2022-01-11 RX ORDER — SODIUM CHLORIDE 9 MG/ML
INJECTION, SOLUTION INTRAVENOUS CONTINUOUS
Status: CANCELLED | OUTPATIENT
Start: 2022-01-11

## 2022-01-11 RX ORDER — LIDOCAINE HYDROCHLORIDE 10 MG/ML
1 INJECTION, SOLUTION EPIDURAL; INFILTRATION; INTRACAUDAL; PERINEURAL ONCE
Status: CANCELLED | OUTPATIENT
Start: 2022-01-11 | End: 2022-01-11

## 2022-01-11 RX ORDER — SODIUM CHLORIDE 0.9 % (FLUSH) 0.9 %
10 SYRINGE (ML) INJECTION
Status: CANCELLED | OUTPATIENT
Start: 2022-01-11

## 2022-01-12 ENCOUNTER — HOSPITAL ENCOUNTER (OUTPATIENT)
Dept: PULMONOLOGY | Facility: HOSPITAL | Age: 52
Discharge: HOME | End: 2022-01-12
Attending: SURGERY
Payer: COMMERCIAL

## 2022-01-12 ENCOUNTER — HOSPITAL ENCOUNTER (OUTPATIENT)
Dept: PREADMISSION TESTING | Facility: HOSPITAL | Age: 52
Discharge: HOME | End: 2022-01-12
Attending: SURGERY
Payer: COMMERCIAL

## 2022-01-12 ENCOUNTER — ANESTHESIA EVENT (OUTPATIENT)
Dept: ENDOSCOPY | Facility: HOSPITAL | Age: 52
End: 2022-01-12
Payer: COMMERCIAL

## 2022-01-12 ENCOUNTER — LAB VISIT (OUTPATIENT)
Dept: LAB | Facility: HOSPITAL | Age: 52
End: 2022-01-12
Attending: SURGERY
Payer: COMMERCIAL

## 2022-01-12 VITALS — HEIGHT: 69 IN | BODY MASS INDEX: 31.1 KG/M2 | WEIGHT: 210 LBS

## 2022-01-12 DIAGNOSIS — Z85.038 PERSONAL HISTORY OF COLON CANCER, STAGE II: ICD-10-CM

## 2022-01-12 DIAGNOSIS — Z01.818 PRE-OP TESTING: ICD-10-CM

## 2022-01-12 LAB
ALBUMIN SERPL BCP-MCNC: 4.2 G/DL (ref 3.5–5.2)
ALP SERPL-CCNC: 71 U/L (ref 55–135)
ALT SERPL W/O P-5'-P-CCNC: 60 U/L (ref 10–44)
ANION GAP SERPL CALC-SCNC: 5 MMOL/L (ref 8–16)
AST SERPL-CCNC: 26 U/L (ref 10–40)
BASOPHILS # BLD AUTO: 0.02 K/UL (ref 0–0.2)
BASOPHILS NFR BLD: 0.5 % (ref 0–1.9)
BILIRUB SERPL-MCNC: 0.6 MG/DL (ref 0.1–1)
BUN SERPL-MCNC: 16 MG/DL (ref 6–20)
CALCIUM SERPL-MCNC: 9.9 MG/DL (ref 8.7–10.5)
CHLORIDE SERPL-SCNC: 105 MMOL/L (ref 95–110)
CO2 SERPL-SCNC: 29 MMOL/L (ref 23–29)
CREAT SERPL-MCNC: 1.2 MG/DL (ref 0.5–1.4)
DIFFERENTIAL METHOD: ABNORMAL
EOSINOPHIL # BLD AUTO: 0.1 K/UL (ref 0–0.5)
EOSINOPHIL NFR BLD: 1.8 % (ref 0–8)
ERYTHROCYTE [DISTWIDTH] IN BLOOD BY AUTOMATED COUNT: 12.4 % (ref 11.5–14.5)
EST. GFR  (AFRICAN AMERICAN): >60 ML/MIN/1.73 M^2
EST. GFR  (NON AFRICAN AMERICAN): >60 ML/MIN/1.73 M^2
GLUCOSE SERPL-MCNC: 101 MG/DL (ref 70–110)
HCT VFR BLD AUTO: 41.6 % (ref 40–54)
HGB BLD-MCNC: 14.1 G/DL (ref 14–18)
IMM GRANULOCYTES # BLD AUTO: 0.02 K/UL (ref 0–0.04)
IMM GRANULOCYTES NFR BLD AUTO: 0.5 % (ref 0–0.5)
LYMPHOCYTES # BLD AUTO: 1.2 K/UL (ref 1–4.8)
LYMPHOCYTES NFR BLD: 26.9 % (ref 18–48)
MCH RBC QN AUTO: 32 PG (ref 27–31)
MCHC RBC AUTO-ENTMCNC: 33.9 G/DL (ref 32–36)
MCV RBC AUTO: 94 FL (ref 82–98)
MONOCYTES # BLD AUTO: 0.5 K/UL (ref 0.3–1)
MONOCYTES NFR BLD: 11.8 % (ref 4–15)
NEUTROPHILS # BLD AUTO: 2.6 K/UL (ref 1.8–7.7)
NEUTROPHILS NFR BLD: 58.5 % (ref 38–73)
NRBC BLD-RTO: 0 /100 WBC
PLATELET # BLD AUTO: 264 K/UL (ref 150–450)
PMV BLD AUTO: 9.1 FL (ref 9.2–12.9)
POTASSIUM SERPL-SCNC: 4.1 MMOL/L (ref 3.5–5.1)
PROT SERPL-MCNC: 7.6 G/DL (ref 6–8.4)
RBC # BLD AUTO: 4.41 M/UL (ref 4.6–6.2)
SARS-COV-2 RDRP RESP QL NAA+PROBE: NEGATIVE
SODIUM SERPL-SCNC: 139 MMOL/L (ref 136–145)
WBC # BLD AUTO: 4.39 K/UL (ref 3.9–12.7)

## 2022-01-12 PROCEDURE — 85025 COMPLETE CBC W/AUTO DIFF WBC: CPT | Performed by: SURGERY

## 2022-01-12 PROCEDURE — U0002 COVID-19 LAB TEST NON-CDC: HCPCS | Performed by: SURGERY

## 2022-01-12 PROCEDURE — 93005 ELECTROCARDIOGRAM TRACING: CPT

## 2022-01-12 PROCEDURE — 93010 EKG 12-LEAD: ICD-10-PCS | Mod: ,,, | Performed by: INTERNAL MEDICINE

## 2022-01-12 PROCEDURE — 82378 CARCINOEMBRYONIC ANTIGEN: CPT | Performed by: SURGERY

## 2022-01-12 PROCEDURE — 80053 COMPREHEN METABOLIC PANEL: CPT | Performed by: SURGERY

## 2022-01-12 PROCEDURE — 36415 COLL VENOUS BLD VENIPUNCTURE: CPT | Performed by: SURGERY

## 2022-01-12 PROCEDURE — 93010 ELECTROCARDIOGRAM REPORT: CPT | Mod: ,,, | Performed by: INTERNAL MEDICINE

## 2022-01-12 NOTE — DISCHARGE INSTRUCTIONS
BEFORE THE PROCEDURE:    REPORT ANY CHANGE IN YOUR PHYSICAL CONDITION TO YOUR DOCTOR IMMEDIATELY.  SELF ISOLATE AND CHECK TEMPERATURE DAILY, IF TEMP OVER 100, CALL PHYSICIAN IMMEDIATELY.   NO SMOKING OR ALCOHOL FOR 72 HOURS BEFORE YOUR PROCEDURE.   DO NOT EAT OR DRINK ANYTHING AFTER MIDNIGHT THE NIGHT BEFORE YOUR PROCEDURE.  NO MAKE UP, NAIL POLISH OR JEWELRY.      SOMEONE WILL CALL YOU THE DAY BEFORE YOUR PROCEDURE WITH A CHECK-IN TIME FOR YOUR PROCEDURE.    DAY OF YOUR PROCEDURE:    TAKE BLOOD PRESSURE MEDICATIONS THE MORNING OF YOUR PROCEDURE, WITH SMALL SIPS WATER, AS DIRECTED BY YOUR PHYSICIAN.   DO NOT TAKE ANY DIABETIC MEDICATIONS UNLESS DIRECTED TO DO SO BY YOUR PHYSICIAN.   CONTACT LENSES AND DENTURES MUST BE REMOVED.  A RESPONSIBLE ADULT MUST ACCOMPANY YOU HOME UPON DISCHARGE.   ONLY 1 VISITOR ALLOWED PER ROOM.     COVID TESTING TODAY    YOUR THOUGHTS AND OPINIONS HELP US TO BETTER SERVE YOU.     PLEASE PARTICIPATE IN SURVEYS ABOUT YOUR CARE.    THANK YOU FOR CHOOSING [facility].

## 2022-01-12 NOTE — ANESTHESIA PREPROCEDURE EVALUATION
01/12/2022  José Miguel Nugent is a 51 y.o., male.    Anesthesia Evaluation          Review of Systems  Anesthesia Hx:  History of prior surgery of interest to airway management or planning: nissen fundoplication.   Social:  Non-Smoker   Hematology/Oncology:         -- Cancer in past history: radiation and chemotherapy  Oncology Comments: COLON     Cardiovascular:   Hypertension, well controlled ECG has been reviewed.    Pulmonary:   Sleep Apnea, CPAP    Education provided regarding risk of obstructive sleep apnea       Lab Results   Component Value Date    WBC 4.39 01/12/2022    HGB 14.1 01/12/2022    HCT 41.6 01/12/2022    MCV 94 01/12/2022     01/12/2022         Physical Exam  General:  Well nourished    Airway/Jaw/Neck:  Airway Findings: Mouth Opening: Normal Tongue: Normal  General Airway Assessment: Adult  Mallampati: II  Improves to II with phonation.  TM Distance: Normal, at least 6 cm  Jaw/Neck Findings:  Neck ROM: Normal ROM      Dental:  Dental Findings: In tact   Chest/Lungs:  Chest/Lungs Findings: Clear to auscultation, Normal Respiratory Rate     Heart/Vascular:  Heart Findings: Rate: Normal  Rhythm: Regular Rhythm  Sounds: Normal        Mental Status:  Mental Status Findings:  Cooperative        Lab Results   Component Value Date    WBC 4.50 01/19/2021    HGB 13.8 (L) 01/19/2021    HCT 40.5 01/19/2021    MCV 92 01/19/2021     01/19/2021     CMP  Sodium   Date Value Ref Range Status   01/12/2022 139 136 - 145 mmol/L Final     Potassium   Date Value Ref Range Status   01/12/2022 4.1 3.5 - 5.1 mmol/L Final     Chloride   Date Value Ref Range Status   01/12/2022 105 95 - 110 mmol/L Final     CO2   Date Value Ref Range Status   01/12/2022 29 23 - 29 mmol/L Final     Glucose   Date Value Ref Range Status   01/12/2022 101 70 - 110 mg/dL Final     BUN   Date Value Ref Range Status   01/12/2022  16 6 - 20 mg/dL Final     Creatinine   Date Value Ref Range Status   01/12/2022 1.2 0.5 - 1.4 mg/dL Final     Calcium   Date Value Ref Range Status   01/12/2022 9.9 8.7 - 10.5 mg/dL Final     Total Protein   Date Value Ref Range Status   01/12/2022 7.6 6.0 - 8.4 g/dL Final     Albumin   Date Value Ref Range Status   01/12/2022 4.2 3.5 - 5.2 g/dL Final     Total Bilirubin   Date Value Ref Range Status   01/12/2022 0.6 0.1 - 1.0 mg/dL Final     Comment:     For infants and newborns, interpretation of results should be based  on gestational age, weight and in agreement with clinical  observations.    Premature Infant recommended reference ranges:  Up to 24 hours.............<8.0 mg/dL  Up to 48 hours............<12.0 mg/dL  3-5 days..................<15.0 mg/dL  6-29 days.................<15.0 mg/dL    For patients on Eltrombopag therapy, use of Dimension [place] TBIL is   not   recommended.       Alkaline Phosphatase   Date Value Ref Range Status   01/12/2022 71 55 - 135 U/L Final     AST   Date Value Ref Range Status   01/12/2022 26 10 - 40 U/L Final     ALT   Date Value Ref Range Status   01/12/2022 60 (H) 10 - 44 U/L Final     Anion Gap   Date Value Ref Range Status   01/12/2022 5 (L) 8 - 16 mmol/L Final     eGFR if    Date Value Ref Range Status   01/12/2022 >60.0 >60 mL/min/1.73 m^2 Final     eGFR if non    Date Value Ref Range Status   01/12/2022 >60.0 >60 mL/min/1.73 m^2 Final     Comment:     Calculation used to obtain the estimated glomerular filtration  rate (eGFR) is the CKD-EPI equation.          Anesthesia Plan  Type of Anesthesia, risks & benefits discussed:  Anesthesia Type:  MAC    Patient's Preference:   Plan Factors:          Intra-op Monitoring Plan: standard ASA monitors  Intra-op Monitoring Plan Comments:   Post Op Pain Control Plan: multimodal analgesia  Post Op Pain Control Plan Comments:     Induction:    Beta Blocker:  Patient is not currently on a Beta-Blocker  (No further documentation required).       Informed Consent: Patient understands risks and agrees with Anesthesia plan.  Questions answered. Anesthesia consent signed with patient.  ASA Score: 3     Day of Surgery Review of History & Physical: I have interviewed and examined the patient. I have reviewed the patient's H&P dated:  There are no significant changes.  H&P update referred to the surgeon.         Ready For Surgery From Anesthesia Perspective.

## 2022-01-13 LAB — CEA SERPL-MCNC: 1.8 NG/ML (ref 0–5)

## 2022-01-13 NOTE — H&P
[place] [facility]  General Surgery  History & Physical    Patient Name: José Miguel Nugent  MRN: [id number]  Admission Date: (Not on file)  Attending Physician: Zaid Izquierdo MD   Primary Care Provider: Alexia Arredondo MD    Patient information was obtained from patient and past medical records.     Subjective:     Chief Complaint/Reason for Admission:  Personal history of colon cancer, stage II adenocarcinoma of rectosigmoid area    History of Present Illness:  Patient is a 51 y.o. male who in 2019 underwent a low anterior resection laparoscopic for a stage II adenocarcinoma of the rectosigmoid.  Last year he underwent a colonoscopy which showed no evidence of recurrence and is due for the procedure now.  He denies any symptoms.  Routine regular assessment by Oncology showed no evidence of recurrence.    No current facility-administered medications on file prior to encounter.     Current Outpatient Medications on File Prior to Encounter   Medication Sig    co-enzyme Q-10 50 mg capsule Take 50 mg by mouth once daily.    FLUoxetine 20 MG tablet     lisinopriL-hydrochlorothiazide (PRINZIDE,ZESTORETIC) 20-12.5 mg per tablet Take 1 tablet by mouth every morning.    multivit-min/ferrous fumarate (MULTI VITAMIN ORAL) Take 1 tablet by mouth once daily.    omega-3 fatty acids/fish oil (FISH OIL-OMEGA-3 FATTY ACIDS) 300-1,000 mg capsule Take 1 capsule by mouth once daily.    RED YEAST RICE ORAL Take 1 tablet by mouth once daily.       Review of patient's allergies indicates:  No Known Allergies    Past Medical History:   Diagnosis Date    Colon cancer 2019    History of chemotherapy     History of Nissen fundoplication     History of radiation therapy     Hypertension     Sleep apnea     cpap used     Past Surgical History:   Procedure Laterality Date    COLONOSCOPY N/A 1/25/2021    Procedure: COLONOSCOPY;  Surgeon: Zaid Izquierdo MD;  [facility]: [facility];  Service: General;  Laterality: N/A;  5  9AM  CL/SB/BD     GALLBLADDER SURGERY      KNEE SURGERY      left    NISSEN FUNDOPLICATION       Family History     Problem Relation (Age of Onset)    COPD Mother (68)        Tobacco Use    Smoking status: Never Smoker    Smokeless tobacco: Never Used   Substance and Sexual Activity    Alcohol use: Yes     Alcohol/week: 3.0 standard drinks     Types: 1 Glasses of wine, 1 Cans of beer, 1 Shots of liquor per week     Comment: social     Drug use: Never    Sexual activity: Yes     Partners: Female     Review of Systems   Gastrointestinal: Negative for abdominal distention, abdominal pain and anal bleeding.   All other systems reviewed and are negative.    Objective:     Vital Signs (Most Recent):    Vital Signs (24h Range):           There is no height or weight on file to calculate BMI.    Physical Exam  Constitutional:       Appearance: Normal appearance.   HENT:      Head: Normocephalic.      Nose: Nose normal.      Mouth/Throat:      Mouth: Mucous membranes are moist.   Eyes:      Extraocular Movements: Extraocular movements intact.   Cardiovascular:      Rate and Rhythm: Normal rate and regular rhythm.   Pulmonary:      Effort: Pulmonary effort is normal.      Breath sounds: Normal breath sounds.   Abdominal:      General: Abdomen is flat. There is no distension.      Palpations: Abdomen is soft. There is no mass.      Tenderness: There is no abdominal tenderness.   Musculoskeletal:         General: Normal range of motion.      Cervical back: Neck supple.   Lymphadenopathy:      Cervical: No cervical adenopathy.   Skin:     General: Skin is warm and dry.      Coloration: Skin is not jaundiced.   Neurological:      General: No focal deficit present.      Mental Status: He is alert and oriented to person, place, and time.   Psychiatric:         Mood and Affect: Mood normal.         Behavior: Behavior normal.         Significant Labs:  I have reviewed all pertinent lab results within the past 24 hours.    Significant  Diagnostics:  I have reviewed all pertinent imaging results/findings within the past 24 hours.    Assessment/Plan:     Active Diagnoses:    Diagnosis Date Noted POA    PRINCIPAL PROBLEM:  Personal history of colon cancer, stage II [Z85.038]  Yes      Problems Resolved During this Admission:     VTE Risk Mitigation (From admission, onward)    None          Zaid Izquierdo MD  General Surgery  [place] [facility]

## 2022-01-14 ENCOUNTER — HOSPITAL ENCOUNTER (OUTPATIENT)
Facility: HOSPITAL | Age: 52
Discharge: HOME | End: 2022-01-14
Attending: SURGERY | Admitting: SURGERY
Payer: COMMERCIAL

## 2022-01-14 ENCOUNTER — ANESTHESIA (OUTPATIENT)
Dept: ENDOSCOPY | Facility: HOSPITAL | Age: 52
End: 2022-01-14
Payer: COMMERCIAL

## 2022-01-14 VITALS
TEMPERATURE: 96 F | DIASTOLIC BLOOD PRESSURE: 63 MMHG | RESPIRATION RATE: 18 BRPM | OXYGEN SATURATION: 96 % | HEART RATE: 76 BPM | SYSTOLIC BLOOD PRESSURE: 101 MMHG

## 2022-01-14 DIAGNOSIS — Z85.038 PERSONAL HISTORY OF COLON CANCER, STAGE II: ICD-10-CM

## 2022-01-14 DIAGNOSIS — Z85.038 PERSONAL HISTORY OF COLON CANCER: Primary | ICD-10-CM

## 2022-01-14 DIAGNOSIS — Z01.818 PRE-OP TESTING: ICD-10-CM

## 2022-01-14 PROBLEM — K57.30 DIVERTICULOSIS OF SIGMOID COLON: Chronic | Status: ACTIVE | Noted: 2022-01-14

## 2022-01-14 PROCEDURE — 37000008 HC ANESTHESIA 1ST 15 MINUTES: Performed by: SURGERY

## 2022-01-14 PROCEDURE — 45378 DIAGNOSTIC COLONOSCOPY: CPT | Performed by: SURGERY

## 2022-01-14 PROCEDURE — 25000003 PHARM REV CODE 250: Performed by: SURGERY

## 2022-01-14 PROCEDURE — G0105 COLORECTAL SCRN; HI RISK IND: HCPCS | Performed by: SURGERY

## 2022-01-14 PROCEDURE — 37000009 HC ANESTHESIA EA ADD 15 MINS: Performed by: SURGERY

## 2022-01-14 RX ORDER — LIDOCAINE HYDROCHLORIDE 10 MG/ML
1 INJECTION, SOLUTION EPIDURAL; INFILTRATION; INTRACAUDAL; PERINEURAL ONCE
Status: DISCONTINUED | OUTPATIENT
Start: 2022-01-14 | End: 2022-01-14 | Stop reason: HOSPADM

## 2022-01-14 RX ORDER — PROPOFOL 10 MG/ML
VIAL (ML) INTRAVENOUS
Status: DISCONTINUED | OUTPATIENT
Start: 2022-01-14 | End: 2022-01-14

## 2022-01-14 RX ORDER — SODIUM CHLORIDE 9 MG/ML
INJECTION, SOLUTION INTRAVENOUS CONTINUOUS
Status: DISCONTINUED | OUTPATIENT
Start: 2022-01-14 | End: 2022-01-14 | Stop reason: HOSPADM

## 2022-01-14 RX ORDER — SODIUM CHLORIDE 0.9 % (FLUSH) 0.9 %
10 SYRINGE (ML) INJECTION
Status: DISCONTINUED | OUTPATIENT
Start: 2022-01-14 | End: 2022-01-14 | Stop reason: HOSPADM

## 2022-01-14 RX ORDER — CHOLECALCIFEROL (VITAMIN D3) 25 MCG
1000 TABLET ORAL DAILY
COMMUNITY

## 2022-01-14 RX ADMIN — SODIUM CHLORIDE: 0.9 INJECTION, SOLUTION INTRAVENOUS at 08:01

## 2022-01-14 RX ADMIN — Medication 50 MG: at 09:01

## 2022-01-14 RX ADMIN — Medication 100 MG: at 09:01

## 2022-01-14 NOTE — ANESTHESIA POSTPROCEDURE EVALUATION
Anesthesia Post Evaluation    Patient: José Miguel Nugent    Procedure(s) Performed: Procedure(s) (LRB):  COLONOSCOPY (N/A)    Final Anesthesia Type: MAC      Patient location during evaluation: OPS  Patient participation: Yes- Able to Participate  Level of consciousness: awake  Post-procedure vital signs: reviewed and stable  Pain management: adequate  Airway patency: patent    PONV status at discharge: No PONV  Anesthetic complications: no      Cardiovascular status: blood pressure returned to baseline  Respiratory status: spontaneous ventilation  Hydration status: euvolemic  Follow-up not needed.          Vitals Value Taken Time   /63 01/14/22 0946   Temp 35.7 °C (96.3 °F) 01/14/22 0946   Pulse 76 01/14/22 0946   Resp 18 01/14/22 0946   SpO2 96 % 01/14/22 0946         No case tracking events are documented in the log.      Pain/Carlo Score: Carlo Score: 10 (1/14/2022  9:46 AM)

## 2022-01-14 NOTE — DISCHARGE SUMMARY
[place] - Endoscopy  Discharge Note  Short Stay    Procedure(s) (LRB):  COLONOSCOPY (N/A)    OUTCOME: Patient tolerated treatment/procedure well without complication and is now ready for discharge.    DISPOSITION: Home or Self Care    FINAL DIAGNOSIS:  1.  Personal history of colon cancer, stage II, no evidence of recurrence 2. Sigmoid diverticulosis     FOLLOWUP: In clinic in 1 year for another colonoscopy    DISCHARGE INSTRUCTIONS:  No discharge procedures on file.     TIME SPENT ON DISCHARGE:  15 minutes minutes

## 2022-01-14 NOTE — DISCHARGE INSTRUCTIONS
FOLLOW UP WITH DR RUSSO IN 1 YEARS FOR A COLONOSCOPY OR SOONER IF NEEDED.    CALL DR RUSSO'S OFFICE FOR ANY QUESTIONS OR CONCERNS OR REPORT TO THE ER IF URGENT.    THANK YOU FOR CHOOSING [facility]!

## 2022-01-14 NOTE — TRANSFER OF CARE
Anesthesia Transfer of Care Note    Patient: José Miguel Nugent    Procedure(s) Performed: Procedure(s) (LRB):  COLONOSCOPY (N/A)    Patient location: GI    Anesthesia Type: MAC    Transport from OR: Transported from OR on room air with adequate spontaneous ventilation    Post pain: adequate analgesia    Post assessment: no apparent anesthetic complications    Post vital signs: stable    Level of consciousness: awake    Nausea/Vomiting: no nausea/vomiting    Complications: none    Transfer of care protocol was followed      Last vitals:   92/50  16 RR  81HR  100% O2 SAT

## 2022-01-14 NOTE — OP NOTE
[place] - Endoscopy  Colonoscopy Procedure  Operative Note    SUMMARY     Date of Procedure: 1/14/2022     Procedure: Procedure(s) (LRB):  COLONOSCOPY (N/A)    Surgeon(s) and Role:     * Zaid Izquierdo MD - Primary    Assisting Surgeon: None     Patient location: GI    Pre-Operative Diagnosis: Personal history of colon cancer, stage II [Z85.038]    Post-Operative Diagnosis: Post-Op Diagnosis Codes:     * Personal history of colon cancer, stage II [Z85.038]     * Diverticulosis of sigmoid colon [K57.30]     Indications:  Personal history of colon cancer at the area of the rectosigmoid     Anesthesia:  Mac        Procedure:                  The patient was brought in to the endoscopy suite where the risks, benefits, and alternatives of the procedure were described.  The patient was given the opportunity to ask questions and then signed informed consent.  Patient was positioned in the left lateral decubitus position, continuous monitoring was initiated, and supplemental oxygen was provided via nasal cannula.  Adequate sedation was achieved with the above mentioned medications and then titrated during the entire procedure.  Digital rectal exam was performed.  Under direct visualization the colonoscope was introduced through the anus in to the rectum.  The scope was then advanced to the terminal ileum.  Scope was then withdrawn and the mucosa was carefully examined in a circular fashion.  The entire colonic mucosa was examined, including the rectum with retroflexion.  Air was evacuated from the colon and the procedure was terminated.  The patient tolerated the procedure well and was able to be transferred to the recovery area in stable condition.    Findings:                 Digital rectal examination:  Rectal exam showed normal sphincter tone with no masses.  There was no blood on the glove.                      Rectum:  Rectal mucosa appeared unremarkable                    Sigmoid:  The anastomosis was found to be at  20 cm and it showed to be well healed with no evidence of recurrence.  We also found diverticulosis in the residual sigmoid.        Descending:  Descending colon was unremarkable         Transverse:  Transverse colon showed no abnormalities         Ascending:  Ascending colon showed no abnormalities        Cecum:  The cecum mucosa appeared unremarkable.  Ileocecal valve and appendiceal orifice were normal.  We entered into the terminal ileum.        Terminal Ileum:  Terminal ileum mucosa was normal.     Specimens:   Specimens (From admission, onward)    None            Estimated Blood Loss (EBL): * No values recorded between 1/14/2022 12:00 AM and 1/14/2022  9:30 AM *     Complications: No     Diagnostic Impression:  1. Past history of colon cancer at the area of the rectosigmoid, status post resection with no evidence of recurrence 2. Sigmoid diverticulosis     Recommendations: Discharge patient to home.    Disposition: PACU - hemodynamically stable.     Attestation: I performed the procedure.        Follow Up:             No future appointments.        Zaid Izquierdo MD  1/14/2022

## 2022-01-24 ENCOUNTER — HISTORICAL (OUTPATIENT)
Dept: HEMATOLOGY/ONCOLOGY | Facility: CLINIC | Age: 52
End: 2022-01-24

## 2022-01-24 LAB
ABS NEUT (OLG): 3.42 X10(3)/MCL (ref 2.1–9.2)
ALBUMIN SERPL-MCNC: 4.3 GM/DL (ref 3.5–5)
ALBUMIN/GLOB SERPL: 1.6 RATIO (ref 1.1–2)
ALP SERPL-CCNC: 68 UNIT/L (ref 40–150)
ALT SERPL-CCNC: 53 UNIT/L (ref 0–55)
AST SERPL-CCNC: 27 UNIT/L (ref 5–34)
BASOPHILS # BLD AUTO: 0 X10(3)/MCL (ref 0–0.2)
BASOPHILS NFR BLD AUTO: 0.4 %
BILIRUB SERPL-MCNC: 0.5 MG/DL
BILIRUBIN DIRECT+TOT PNL SERPL-MCNC: 0.2 MG/DL (ref 0–0.5)
BILIRUBIN DIRECT+TOT PNL SERPL-MCNC: 0.3 MG/DL (ref 0–0.8)
BUN SERPL-MCNC: 19.9 MG/DL (ref 8.4–25.7)
CALCIUM SERPL-MCNC: 10 MG/DL (ref 8.7–10.5)
CEA SERPL-MCNC: 1.77 NG/ML (ref 0–3)
CHLORIDE SERPL-SCNC: 99 MMOL/L (ref 98–107)
CO2 SERPL-SCNC: 29 MMOL/L (ref 22–29)
CREAT SERPL-MCNC: 1.14 MG/DL (ref 0.73–1.18)
EOSINOPHIL # BLD AUTO: 0.1 X10(3)/MCL (ref 0–0.9)
EOSINOPHIL NFR BLD AUTO: 2.6 %
ERYTHROCYTE [DISTWIDTH] IN BLOOD BY AUTOMATED COUNT: 12.6 % (ref 11.5–17)
GLOBULIN SER-MCNC: 2.7 GM/DL (ref 2.4–3.5)
GLUCOSE SERPL-MCNC: 100 MG/DL (ref 74–100)
HCT VFR BLD AUTO: 44 % (ref 42–52)
HGB BLD-MCNC: 14.5 GM/DL (ref 14–18)
LYMPHOCYTES # BLD AUTO: 1.2 X10(3)/MCL (ref 0.6–4.6)
LYMPHOCYTES NFR BLD AUTO: 22.7 %
MCH RBC QN AUTO: 31.5 PG (ref 27–31)
MCHC RBC AUTO-ENTMCNC: 33 GM/DL (ref 33–36)
MCV RBC AUTO: 95.4 FL (ref 80–94)
MONOCYTES # BLD AUTO: 0.5 X10(3)/MCL (ref 0.1–1.3)
MONOCYTES NFR BLD AUTO: 9.8 %
NEUTROPHILS # BLD AUTO: 3.4 X10(3)/MCL (ref 2.1–9.2)
NEUTROPHILS NFR BLD AUTO: 64.1 %
PLATELET # BLD AUTO: 255 X10(3)/MCL (ref 130–400)
PMV BLD AUTO: 8.8 FL (ref 9.4–12.4)
POC CREATININE: 1.3 MG/DL (ref 0.6–1.3)
POTASSIUM SERPL-SCNC: 4.4 MMOL/L (ref 3.5–5.1)
PROT SERPL-MCNC: 7 GM/DL (ref 6.4–8.3)
RBC # BLD AUTO: 4.61 X10(6)/MCL (ref 4.7–6.1)
SODIUM SERPL-SCNC: 136 MMOL/L (ref 136–145)
WBC # SPEC AUTO: 5.3 X10(3)/MCL (ref 4.5–11.5)

## 2022-04-30 NOTE — PROGRESS NOTES
Patient:   José Miguel Nugent            MRN: [id number]            FIN: [id number]               Age:   51 years     Sex:  Male     :  1970   Associated Diagnoses:   Rectal cancer; Anemia; Macrocytic; Pulmonary nodule; Abnormal liver CT   Author:   Alton Marinelli      Surgeon: Dr. Slick Dangelo  Radiation Oncologist: Dr. Jordan Bey  Surgeon in [place]/colonoscopy: Dr. Zaid Izquierdo    Rectal Cancer Stage IIA (T3/N0/M0) diagnosed 19  Biopsy/pathology: Colonoscopy done 19--ulcerated mass noted at rectosigmoid junction, mass was traversed and reached all the way to cecum, diverticulosis noted in sigmoid but no other abnormalities, mass began at 15cm biopsies done and rectal polyp noted at 10cm and removed. Pathology from tumor showed moderately differentiated adenocarcinoma, polyp c/w hyperplastic polyp.  S/p LAR on 1/15/20 by Dr. Dangelo--Minimal residual moderately differentiated adenocarcinoma, 9mm in greatest dimension (near complete response). Invades through the muscularis propria into pericolonic adipose tissue. Margins negative. No lymphatic invasion. 16 pericolonic lymph nodes negative for carcinoma. [id number] present measuring 9 mm.   Imagin. MRI pelvis w/ and w/o contrast 10/11/19--a circumferential malignancy present at rectosigmoid junction and measures approximately 8cm in length, extends to approximately 7cm cranial to anal verge, tumor is full thickness and may invade through the rectal serosa but does not appear to invade adjacent perirectal fat, 3mm nodule present to left perirectal fat is concerning but indeterminate for early regional metastasis, no pelvic lymphadenopathy identified.  2. CT C/A with contrast done 10/11/19--2 mm right lower lobe pulmonary nodule is nonspecific, three subcentimeter hepatic hypodensities are too small to characterize, otherwise no definite evidence of metastatic disease in the chest or abdomen.  3. CT A/P done 20--slightly  improved appearance of wall thickening around the rectosigmoid junction, small vague hepatic hypodensities show little change, no new suspicious findings.  4.  CT C/A/P done 7/13/20--stable right lower lobe pulmonary nodule, scarring right middle lobe and lingula, stable subcentimeter hypodensities in the liver too small to characterize unchanged, scattered atherosclerotic vascular calcifications, status post left 8 low anterior resection, small periumbilical hernia no evidence of residual recurrent or metastatic disease.  5. CT C/A/P done 1/22/21--No findings to suggest active or progressive disease in the chest abdomen or pelvis    CEA:  09/23/19--2.5  02/26/20--1.5  05/20/20--3.2  07/13/20-- 1.82  01/22/21--1.88    Procedures:  Mediport placed by Dr. Dangelo 2/21/20.    Treatment History:  1. Neoadjuvant Xeloda/XRT. 10/28/19 - 12/6/19.   2. Adjuvant XELOX x 6 cycles started on 2/26/20. Xeloda dose reduced to 1500mg PO BID on 3/3/20. Oxaliplatin reduced to 100mg/m2 for Cycle 5 due to neuropathy.   Cycle 6 with Xeloda only completed on 7/23/20.     Treatment plan:  Observation. Repeat CT in 7/2021      Visit Information   Visit type:  Scheduled follow-up.    Accompanied by:  No one.       Chief Complaint   4/26/2021 8:30 CDT       3 month f/u        Interval History   Current complaint: Patient presents for  follow-up of rectal cancer. He is doing well. His biggest complaint is weight gain and difficulty losing weight. Noted 10 lb weight gain since his last appointment. He also continues to complain of lower back pain, mostly on his right side. Described as muscular pain with stiffness in the mornings but gets better when he gets up and moves around. It is intermittent and not worsening. Offered MRI for further evaluation and he wants to hold off for now. Bowels are moving well and he denies any abdominal pain. No other problems reported.      Review of Systems   Constitutional:  No fever, No chills, No weakness,  No fatigue.    Eye:  No recent visual problem.    Ear/Nose/Mouth/Throat:  No decreased hearing, No nasal congestion, No sore throat.    Respiratory:  No shortness of breath, No cough, No wheezing.    Cardiovascular:  No chest pain, No palpitations, No peripheral edema.    Gastrointestinal:  No nausea, No vomiting, No diarrhea, No constipation, No abdominal pain.    Hematology/Lymphatics:  No bruising tendency, No bleeding tendency.    Musculoskeletal:  No joint pain     Back pain: On the right side, In the lower region.    Integumentary:  No rash, No skin lesion.    Neurologic:  Alert and oriented X4, Numbness, Tingling, improving to bilateral extremities, No confusion, No headache.    Psychiatric:  Anxiety, No depression.    All other systems are negative      Health Status   Allergies:    Allergic Reactions (Selected)  No Known Allergies   Current medications:  (Selected)   Outpatient Medications  Future  heparin flush 100 units/mL (500 Unit  Flush): 500 units, form: Injection, IV Push, Once-chemo, *Est. first dose 04/30/21 8:00:00 CDT, *Est. stop date 04/30/21 8:00:00 CDT, Routine, Heparin Flush for Medi-port, Weeks 9, Future Order  heparin flush 100 units/mL (500 Unit  Flush): 500 units, form: Injection, IV Push, Once-chemo, *Est. first dose 06/25/21 8:00:00 CDT, *Est. stop date 06/25/21 8:00:00 CDT, Routine, Heparin Flush for Medi-port, Weeks 17, Future Order  heparin flush 100 units/mL (500 Unit  Flush): 500 units, form: Injection, IV Push, Once-chemo, *Est. first dose 08/20/21 8:00:00 CDT, *Est. stop date 08/20/21 8:00:00 CDT, Routine, Heparin Flush for Medi-port, Weeks 25, Future Order  Prescriptions  Prescribed  traMADol 50 mg oral tablet: 50 mg = 1 tab(s), Oral, q4hr, PRN PRN for pain, medically necessary for greater than 7 day supply, # 60 tab(s), 1 Refill(s), Pharmacy: [facility], 175, cm, Height/Length Dosing, 03/03/20 9:26:00 CST, 83, kg, Weight Dosing, 03/03/20 9:26:00 CST  Documented  Medications  Documented  FLUoxetine 20 mg oral tablet: 20 mg = 1 tab(s), Oral, Daily  Misc Prescription: 0 Refill(s)  Zyrtec 10 mg oral tablet: 10 mg = 1 tab(s), Oral, Daily, PRN PRN for allergy symptoms, 0 Refill(s)  allopurinol 100 mg oral tablet: 100 mg = 1 tab(s), Oral, Daily, Takes PRN, # 30 tab(s), 0 Refill(s)  hydrochlorothiazide-lisinopril 12.5 mg-20 mg oral tablet: 1 tab(s), Oral, Daily  omeprazole 20 mg oral DR capsule: 20 mg = 1 cap(s), Oral, Daily, PRN PRN heartburn, 0 Refill(s)      Histories   Past Medical History:    Resolved  Obesity ([id number]):  Resolved.  HTN - Hypertension ([id number]):  Resolved.  Diverticulitis ([id number]):  Resolved.   Family History:    Father    History is unknown.  Mother  Diabetes mellitus type 2  Emphysema  Brother    History is negative.  Uncle: Maternal    Primary malignant neoplasm of prostate     Procedure history:    Catheter Insertion Mediport (.) on 2/5/2020 at 49 Years.  Comments:  2/5/2020 8:42 CST - Jose Maria Howe RN  auto-populated from documented surgical case  Colon Resection - Lower Anterior Robotic Assist (.) on 1/15/2020 at 49 Years.  Comments:  1/15/2020 11:57 CST - Shira Jackson  auto-populated from documented surgical case  L knee surgery.  Cholecystectomy.  Nissen Fund.   Social History     Alcohol  Type: Beer  Frequency: 1-2 times per week    Employment/School  Status: Employed  Description:     Home/Environment  Lives with: Spouse    Tobacco  Use: Former smoker, quit more  Comment: States he smokes a few cigarettes when he has drinks on the weekend.        Physical Examination   Vital Signs   4/26/2021 8:28 CDT       Temperature Oral          36.4 DegC                             Temperature Oral (calculated)             97.52 DegF                             Peripheral Pulse Rate     60 bpm                             Respiratory Rate          20 br/min                             SpO2                      97 %                              Oxygen Therapy            Room air                             Systolic Blood Pressure   137 mmHg                             Diastolic Blood Pressure  79 mmHg                             Blood Pressure Location   Right arm                             Manual Cuff BP            No     General:  Alert and oriented, No acute distress, pleasant white male.    Eye:  Pupils are equal, round and reactive to light, Vision unchanged.    HENT:  Normocephalic, Normal hearing, Oral mucosa is moist.    Respiratory:  Lungs are clear to auscultation, Respirations are non-labored, Breath sounds are equal, Symmetrical chest wall expansion, left chest wall mediport in place.    Neurologic:  Alert, Oriented, Cranial Nerves II-XII are grossly intact.    Psychiatric:  Cooperative, Appropriate mood & affect.    Neck:  Supple, Non-tender, No jugular venous distention, No thyromegaly.    Cardiovascular:  Normal rate, Regular rhythm, No murmur, No edema.    Gastrointestinal:  Soft, Non-tender, Non-distended, Normal bowel sounds, No organomegaly, Lap incisions all healed well.    Musculoskeletal:  Normal range of motion, Normal strength, No deformity, Normal gait.    Integumentary:  Warm, Intact, Moist, No rash.    Cognition and Speech:  Oriented, Speech clear and coherent, Functional cognition intact.    ECOG Performance Scale: 0 - Fully active; no performance restrictions.      Review / Management   Results review:  Lab results   4/26/2021 8:15 CDT       RBC                       4.04 x10(6)/mcL  LOW                             Hgb                       12.8 gm/dL  LOW                             Hct                       38.0 %  LOW                             Platelet                  236 x10(3)/mcL                             MCV                       94.1 fL  HI                             MCH                       31.7 pg  HI                             MCHC                      33.7 gm/dL                             RDW                        12.4 %                             MPV                       8.9 fL  LOW                             Abs Neut                  2.57 x10(3)/mcL                             NEUT%                     61.8 %  NA                             NEUT#                     2.6 x10(3)/mcL                             LYMPH%                    23.3 %  NA                             LYMPH#                    1.0 x10(3)/mcL                             MONO%                     11.3 %  NA                             MONO#                     0.5 x10(3)/mcL                             EOS%                      2.6 %  NA                             EOS#                      0.1 x10(3)/mcL                             BASO%                     0.5 %  NA                             BASO#                     0.0 x10(3)/mcL  .       Impression and Plan   Diagnosis     Rectal cancer (URA67-KS C20).     Anemia (DRF69-MI D64.9).     Macrocytic (SIW36-CV D75.89).     Pulmonary nodule (EHY23-YM R91.1).     Abnormal liver CT (YYY32-PS R93.2).     Plan   Patient with rectal cancer diagnosed 9/23/19, appears to be stage IIA (T3N0M0) by staging studies, MRI pelvis and CT C/A.  There is an indeterminate perirectal nodule too small to characterize but no lymphadenopathy, a few small hypodense liver lesions, likely cysts and a 2mm lung nodule but no obvious metastatic disease.  Per NCCN guidelines, I have recommended treatment with neoadjuvant Xeloda/XRT.  Patient was seen by Dr. Dangelo who recommended the same.    Patient started Xeloda 1650mg PO BID and XRT on 10/28/19. Completed on 12/6/19.   He tolerated very well.   CT A/P repeat done 1/2/20 shows slight improvement of wall thickening around the rectosigmoid junction, otherwise no new findings.    S/p LAR on 1/15/20 by Dr. Dangelo. Minimal residual moderately differentiated adenocarcinoma, 9mm in greatest dimension (near complete response). Invades through the muscularis propria  into pericolonic adipose tissue. Margins negative. No lymphatic invasion. 16 pericolonic lymph nodes negative for carcinoma. [id number] present measuring 9 mm.   Near complete response!   Adjuvant XELOX x 6 cycles planned.   XELOX cycle 1 started 2/26/20. Patient had a difficult time tolerating with severe HA and nausea.  Xeloda reduced to 1500mg PO BID on 3/3/20 during cycle 1 and Emend added with cycle 2.  He started having more neuropathy that was painful at times so the Oxaliplatin dose was reduced with Cycle 5. His neuropathy continued without any change despite the dose reduction.   Cycle 6 completed on 6/23/20 with Xeloda only (Omitted the Oxaliplatin). Tolerated well.   CT C/A/P 7/2020 revealed stable right lower lobe pulmonary nodule, scarring right middle lobe and lingula, stable subcentimeter hypodensities in the liver too small to characterize unchanged, scattered atherosclerotic vascular calcifications, status post left 8 low anterior resection, small periumbilical hernia no evidence of residual recurrent or metastatic disease.  CT C/A/P 1/22/21 shows JOSUE.    Patient currently without any clinical signs or laboratory evidence of disease recurrence.   CBC today continues to show mild macrocytic anemia and on/off leukopenia. ANC normal. CMP and CEA are pending.   Will add Folate level, Retic count and Vitamin B12 to labs drawn today.   Continue surveillance visits.  RTC 3 months for follow-up with visit and labs.  Due for surveillance CT scans in 3 months. Will schedule.   Plan for continued observation with CT scans every 6 months x 5 years.   Had colonoscopy with Dr. Zaid Izquierdo surgeon in [place] on 1/23/21. He plans to repeat next year 1/2022.  Continue mediport flushes, due today.  Offered patient MRI of Lumbar spine/hip area to evaluate his low back pain to the right. Patient declined at this time but will let me know if he changes his mind.    All questions answered at this time.      Alton  Rachel, PIERO

## 2022-04-30 NOTE — OP NOTE
DATE OF SURGERY:    02/05/2020    SURGEON:  Slick Dangelo IV, MD  ASSISTANT:  None    PREOPERATIVE DIAGNOSIS:  Rectal cancer, stage IIA, status post neoadjuvant chemoradiation and low anterior resection with need for long-term intravenous access for adjuvant chemo.    POSTOPERATIVE DIAGNOSIS:  Rectal cancer, stage IIA, status post neoadjuvant chemoradiation and low anterior resection with need for long-term intravenous access for adjuvant chemo.    PROCEDURE:  Placement of left subclavian 6-Faro[place] PowerPort under fluoroscopic guidance.    ANESTHESIA:  General IV with local infiltration.    ESTIMATED BLOOD LOSS:  Less than 5 cc.    IMPLANT:  Bard PowerPort Slim, reference #[id number], lot number [id number].    DETAILS OF PROCEDURE:  After proper informed consent was obtained, the patient was transported to the operating room where he was placed supine on the operating room table.  After an adequate level of general IV anesthesia was achieved, the patient's neck and chest were prepped and draped in a standard sterile surgical fashion.  The operation commenced with infiltration of local anesthetic in the left subclavicular fossa, followed by venipuncture of the left subclavian vein.  A Seldinger wire was inserted.  The position was confirmed by fluoroscopy.  A counterincision was made upon the chest wall, and a pocket created for the MediPort.  The MediPort was placed within the pocket.  Port tip was tunneled to the venipuncture site, fluoroscopically measured, and trimmed to the appropriate length.  A sheath obturator was placed over the guidewire.  The guidewire and obturator were removed.  The catheter was placed down the sheath, which was peeled away leaving it in good position.  The wounds were irrigated with warm normal saline and closed with a combination of 3-0 Vicryl and 4-0 Monocryl subcuticular closures.  Sterile dressings were applied.  The patient was awakened from his anesthesia and transported to  the recovery     room in good and stable condition.  All sponge, needle, and instrument counts were correct at the end of the case.  There were no complications.        ______________________________  MD MISTI Cline IV/PEÑA  DD:  02/21/2020  Time:  02:23PM  DT:  02/21/2020  Time:  02:35PM  Job #:  [id number]

## 2022-04-30 NOTE — PROGRESS NOTES
Patient:   José Miguel Nugent            MRN: [id number]            FIN: [id number]               Age:   50 years     Sex:  Male     :  1970   Associated Diagnoses:   Rectal cancer; Headache; Anemia   Author:   Alton Ramos      Surgeon: Dr. Slick Dangelo  Radiation Oncologist: Dr. Jordan Bey    Rectal Cancer Stage IIA (T3/N0/M0) diagnosed 19  Biopsy/pathology: Colonoscopy done 19--ulcerated mass noted at rectosigmoid junction, mass was traversed and reached all the way to cecum, diverticulosis noted in sigmoid but no other abnormalities, mass began at 15cm biopsies done and rectal polyp noted at 10cm and removed. Pathology from tumor showed moderately differentiated adenocarcinoma, polyp c/w hyperplastic polyp.  S/p LAR on 1/15/20 by Dr. Dangelo--Minimal residual moderately differentiated adenocarcinoma, 9mm in greatest dimension (near complete response). Invades through the muscularis propria into pericolonic adipose tissue. Margins negative. No lymphatic invasion. 16 pericolonic lymph nodes negative for carcinoma. [id number] present measuring 9 mm.   Imagin. MRI pelvis w/ and w/o contrast 10/11/19--a circumferential malignancy present at rectosigmoid junction and measures approximately 8cm in length, extends to approximately 7cm cranial to anal verge, tumor is full thickness and may invade through the rectal serosa but does not appear to invade adjacent perirectal fat, 3mm nodule present to left perirectal fat is concerning but indeterminate for early regional metastasis, no pelvic lymphadenopathy identified.  2. CT C/A with contrast done 10/11/19--2 mm right lower lobe pulmonary nodule is nonspecific, three subcentimeter hepatic hypodensities are too small to characterize, otherwise no definite evidence of metastatic disease in the chest or abdomen.  3. CT A/P done 20--slightly improved appearance of wall thickening around the rectosigmoid junction, small vague hepatic  hypodensities show little change, no new suspicious findings.    CEA:  09/23/19--2.5  02/26/20--1.5  05/20/20--3.2    Procedures:  Mediport placed by Dr. Dangelo 2/21/20.    Treatment History:  1. Neoadjuvant Xeloda/XRT. 10/28/19 - 12/6/19.   2. Adjuvant XELOX x 6 cycles started on 2/26/20. Xeloda dose reduced to 1500mg PO BID on 3/3/20. Oxaliplatin reduced to 100mg/m2 for Cycle 5 due to neuropathy.   Cycle 6 with Xeloda only completed on 7/23/20.     Treatment plan:  CT scans in July 2020.       Visit Information   Visit type:  Scheduled follow-up.    Accompanied by:  No one.       Chief Complaint   No complaints      Interval History   Current complaint: Patient presents for telemedicine follow-up of rectal cancer. He is doing okay. Completed his 6th cycle of treatment with Xeloda only yesterday. Tolerated well. States his neuropathy is a little better since stopping the Oxaliplatin. Continues to exercise frequently. He is going to the beach with his family next weekend. CT scans are scheduled on 7/13/20 and he plans on returning to work on 7/15/20. No other problems reported.      Review of Systems   Constitutional:  Fatigue, No fever, No chills, No weakness.    Eye:  No recent visual problem.    Ear/Nose/Mouth/Throat:  No decreased hearing, No nasal congestion, No sore throat.    Respiratory:  No shortness of breath, No cough, No wheezing.    Cardiovascular:  No chest pain, No palpitations, No peripheral edema.    Gastrointestinal:  No nausea, No vomiting, No diarrhea, No constipation, No abdominal pain.    Hematology/Lymphatics:  No bruising tendency, No bleeding tendency.    Musculoskeletal:  No back pain, No joint pain.    Integumentary:  No rash, No skin lesion.    Neurologic:  Alert and oriented X4, Numbness, Tingling, Headache (better), better, No confusion.    Psychiatric:  Anxiety, No depression.    All other systems are negative      Health Status   Allergies:    Allergic Reactions (Selected)  No Known  Allergies   Current medications:  (Selected)   Outpatient Medications  Future  heparin flush 100 units/mL (500 Unit  Flush): 500 units, form: Injection, IV Push, Once-chemo, *Est. first dose 01/06/21 8:00:00 CST, *Est. stop date 01/06/21 8:00:00 CST, Routine, Heparin Flush for Medi-port, Weeks 25, Future Order  heparin flush 100 units/mL (500 Unit  Flush): 500 units, form: Injection, IV Push, Once-chemo, *Est. first dose 07/22/20 8:00:00 CDT, *Est. stop date 07/22/20 8:00:00 CDT, Routine, Heparin Flush for Medi-port, Weeks 1, Future Order  heparin flush 100 units/mL (500 Unit  Flush): 500 units, form: Injection, IV Push, Once-chemo, *Est. first dose 09/16/20 8:00:00 CDT, *Est. stop date 09/16/20 8:00:00 CDT, Routine, Heparin Flush for Medi-port, Weeks 9, Future Order  heparin flush 100 units/mL (500 Unit  Flush): 500 units, form: Injection, IV Push, Once-chemo, *Est. first dose 11/11/20 8:00:00 CST, *Est. stop date 11/11/20 8:00:00 CST, Routine, Heparin Flush for Medi-port, Weeks 17, Future Order  Prescriptions  Prescribed  capecitabine 500 mg oral tablet: 2,000 mg = 4 tab(s), Oral, BID, 7 days off every 3 weeks, # 112 tab(s), 6 Refill(s), Pharmacy: [facility], 175, cm, Height/Length Dosing, 01/28/20 9:53:00 CST, 83.5, kg, Weight Dosing, 01/28/20 9:53:00 CST  prochlorperazine 5 mg oral tablet: 5 mg = 1 tab(s), Oral, TID, PRN PRN for nausea/vomiting, # 30 tab(s), 1 Refill(s), Pharmacy: [facility], 175, cm, Height/Length Dosing, 03/03/20 9:26:00 CST, 83, kg, Weight Dosing, 03/03/20 9:26:00 CST  traMADol 50 mg oral tablet: 50 mg = 1 tab(s), Oral, q4hr, PRN PRN for pain, medically necessary for greater than 7 day supply, # 60 tab(s), 1 Refill(s), Pharmacy: [facility], 175, cm, Height/Length Dosing, 03/03/20 9:26:00 CST, 83, kg, Weight Dosing, 03/03/20 9:26:00 CST  Documented Medications  Documented  FLUoxetine 20 mg oral tablet: 20 mg = 1 tab(s), Oral, Daily  Misc Prescription: 0  Refill(s)  Vitamin B Complex oral capsule: 1 tab(s), Oral, Daily, 0 Refill(s)  Vitamin B6 250 mg oral tablet: See Instructions, tab 1 PO Daily, 0 Refill(s)  Zofran 8 mg oral tablet: 8 mg = 1 tab(s), Oral, q8hr, PRN PRN as needed for nausea/vomiting, # 30, 2 Refill(s)  Zyrtec 10 mg oral tablet: 10 mg = 1 tab(s), Oral, Daily, PRN PRN for allergy symptoms, 0 Refill(s)  allopurinol 100 mg oral tablet: 100 mg = 1 tab(s), Oral, Daily, Takes PRN, # 30 tab(s), 0 Refill(s)  glutamine oral powder for reconstitution: 10 gms, Oral, BID, 0 Refill(s)  hydrochlorothiazide-lisinopril 12.5 mg-20 mg oral tablet: 1 tab(s), Oral, Daily  omeprazole 20 mg oral DR capsule: 20 mg = 1 cap(s), Oral, Daily, PRN PRN heartburn, 0 Refill(s)      Histories   Past Medical History:    Resolved  Obesity ([id number]):  Resolved.  HTN - Hypertension ([id number]):  Resolved.  Diverticulitis ([id number]):  Resolved.   Family History:    Father    History is unknown.  Mother  Diabetes mellitus type 2  Emphysema  Brother    History is negative.  Uncle: Maternal    Primary malignant neoplasm of prostate     Procedure history:    Catheter Insertion Mediport (.) on 2/5/2020 at 49 Years.  Comments:  2/5/2020 8:42 CST - Jose Maria Howe RN  auto-populated from documented surgical case  Colon Resection - Lower Anterior Robotic Assist (.) on 1/15/2020 at 49 Years.  Comments:  1/15/2020 11:57 LEO - Shira Jackson  auto-populated from documented surgical case  L knee surgery.  Cholecystectomy.  Nissen Fund.   Social History     Alcohol  Type: Beer  Frequency: 1-2 times per week    Employment/School  Status: Employed  Description:     Home/Environment  Lives with: Spouse    Tobacco  Use: Former smoker, quit more  Comment: States he smokes a few cigarettes when he has drinks on the weekend.        Physical Examination   Weight: 195 lbs   General:  Alert and oriented, No acute distress, pleasant white male.    Eye:  Pupils are equal, round and reactive  to light, Vision unchanged.    HENT:  Normocephalic, Normal hearing.    Respiratory:  Respirations are non-labored.    Neurologic:  Alert, Oriented.    Psychiatric:  Cooperative, Appropriate mood & affect.    Cognition and Speech:  Oriented, Speech clear and coherent.       Impression and Plan   Diagnosis     Rectal cancer (FVR73-QT C20).     Headache (TWT92-UR R51).     Anemia (CSQ70-UH D64.9).     Plan   Patient with rectal cancer diagnosed 9/23/19, appears to be stage IIA (T3N0M0) by staging studies, MRI pelvis and CT C/A.  There is an indeterminate perirectal nodule too small to characterize but no lymphadenopathy, a few small hypodense liver lesions, likely cysts and a 2mm lung nodule but no obvious metastatic disease.  Per NCCN guidelines, I have recommended treatment with neoadjuvant Xeloda/XRT.  Patient was seen by Dr. Dangelo who recommended the same.    Patient started Xeloda 1650mg PO BID and XRT on 10/28/19. Completed on 12/6/19.   He tolerated very well.   CT A/P repeat done 1/2/20 shows slight improvement of wall thickening around the rectosigmoid junction, otherwise no new findings.    S/p LAR on 1/15/20 by Dr. Dangelo. Minimal residual moderately differentiated adenocarcinoma, 9mm in greatest dimension (near complete response). Invades through the muscularis propria into pericolonic adipose tissue. Margins negative. No lymphatic invasion. 16 pericolonic lymph nodes negative for carcinoma. [id number] present measuring 9 mm.   Near complete response!     Adjuvant XELOX x 6 cycles planned.  XELOX cycle 1 started 2/26/20. Patient had a difficult time tolerating with severe HA and nausea.  Xeloda reduced to 1500mg PO BID on 3/3/20 during cycle 1 and Emend added with cycle 2.  He started having more neuropathy that was painful at times so the Oxaliplatin dose was reduced with Cycle 5.   His neuropathy continued without any change despite the dose reduction.   Cycle 6 completed on 6/23/20 with Xeloda only  (Omitted the Oxaliplatin). Tolerated well.     Scheduled for baseline CT of C/A/P on 7/13/20. Plan will be for scans every 6 months x 3 years then annual up to 5 years.   Will have him do labs also on 7/13/20 and follow-up on 7/14/20 before he returns to work.   All questions answered at this time.    This is a telemedicine note. Patient was treated using telemedicine, realtime audio and video, according to [facility] protocol. I, distant provider, conducted the visit from [facility]. The patient participated in the visit at a non-[facility] location selected by the patient, identified at his/her home. I am licensed in the state where the patient stated he or she was located. The patient stated that he/she understood and accepted the privacy and security risks to their information at their location.     Alton Ramos, GUZMAN

## 2022-07-20 ENCOUNTER — HOSPITAL ENCOUNTER (OUTPATIENT)
Dept: RADIOLOGY | Facility: HOSPITAL | Age: 52
Discharge: HOME | End: 2022-07-20
Attending: INTERNAL MEDICINE
Payer: COMMERCIAL

## 2022-07-20 DIAGNOSIS — Z85.038 PERSONAL HISTORY OF COLON CANCER, STAGE II: Primary | ICD-10-CM

## 2022-07-20 DIAGNOSIS — C20 RECTAL CANCER: ICD-10-CM

## 2022-07-20 LAB
ALBUMIN SERPL-MCNC: 4 GM/DL (ref 3.5–5)
ALBUMIN/GLOB SERPL: 1.7 RATIO (ref 1.1–2)
ALP SERPL-CCNC: 55 UNIT/L (ref 40–150)
ALT SERPL-CCNC: 26 UNIT/L (ref 0–55)
AST SERPL-CCNC: 23 UNIT/L (ref 5–34)
BASOPHILS # BLD AUTO: 0.03 X10(3)/MCL (ref 0–0.2)
BASOPHILS NFR BLD AUTO: 0.5 %
BILIRUBIN DIRECT+TOT PNL SERPL-MCNC: 0.5 MG/DL
BUN SERPL-MCNC: 13.1 MG/DL (ref 8.4–25.7)
CALCIUM SERPL-MCNC: 9.3 MG/DL (ref 8.4–10.2)
CEA SERPL-MCNC: <1.73 NG/ML (ref 0–3)
CHLORIDE SERPL-SCNC: 102 MMOL/L (ref 98–107)
CO2 SERPL-SCNC: 26 MMOL/L (ref 22–29)
CREAT SERPL-MCNC: 1.2 MG/DL (ref 0.5–1.4)
CREAT SERPL-MCNC: 1.23 MG/DL (ref 0.73–1.18)
EOSINOPHIL # BLD AUTO: 0.1 X10(3)/MCL (ref 0–0.9)
EOSINOPHIL NFR BLD AUTO: 1.5 %
ERYTHROCYTE [DISTWIDTH] IN BLOOD BY AUTOMATED COUNT: 13.3 % (ref 11.5–17)
GLOBULIN SER-MCNC: 2.3 GM/DL (ref 2.4–3.5)
GLUCOSE SERPL-MCNC: 101 MG/DL (ref 74–100)
HCT VFR BLD AUTO: 42.5 % (ref 42–52)
HGB BLD-MCNC: 14.2 GM/DL (ref 14–18)
IMM GRANULOCYTES # BLD AUTO: 0.01 X10(3)/MCL (ref 0–0.04)
IMM GRANULOCYTES NFR BLD AUTO: 0.2 %
LYMPHOCYTES # BLD AUTO: 1.06 X10(3)/MCL (ref 0.6–4.6)
LYMPHOCYTES NFR BLD AUTO: 16.2 %
MCH RBC QN AUTO: 32.1 PG (ref 27–31)
MCHC RBC AUTO-ENTMCNC: 33.4 MG/DL (ref 33–36)
MCV RBC AUTO: 95.9 FL (ref 80–94)
MONOCYTES # BLD AUTO: 0.74 X10(3)/MCL (ref 0.1–1.3)
MONOCYTES NFR BLD AUTO: 11.3 %
NEUTROPHILS # BLD AUTO: 4.6 X10(3)/MCL (ref 2.1–9.2)
NEUTROPHILS NFR BLD AUTO: 70.3 %
PLATELET # BLD AUTO: 257 X10(3)/MCL (ref 130–400)
PMV BLD AUTO: 8.9 FL (ref 7.4–10.4)
POTASSIUM SERPL-SCNC: 4.2 MMOL/L (ref 3.5–5.1)
PROT SERPL-MCNC: 6.3 GM/DL (ref 6.4–8.3)
RBC # BLD AUTO: 4.43 X10(6)/MCL (ref 4.7–6.1)
SAMPLE: NORMAL
SODIUM SERPL-SCNC: 136 MMOL/L (ref 136–145)
WBC # SPEC AUTO: 6.5 X10(3)/MCL (ref 4.5–11.5)

## 2022-07-20 PROCEDURE — 74177 CT ABD & PELVIS W/CONTRAST: CPT | Mod: TC

## 2022-07-20 PROCEDURE — 25500020 PHARM REV CODE 255: Performed by: INTERNAL MEDICINE

## 2022-07-20 PROCEDURE — 71260 CT THORAX DX C+: CPT | Mod: TC

## 2022-07-20 PROCEDURE — 36415 COLL VENOUS BLD VENIPUNCTURE: CPT | Performed by: NURSE PRACTITIONER

## 2022-07-20 RX ADMIN — IOPAMIDOL 100 ML: 755 INJECTION, SOLUTION INTRAVENOUS at 08:07

## 2022-07-22 PROBLEM — C20 RECTAL CANCER: Status: ACTIVE | Noted: 2022-07-22

## 2022-07-22 NOTE — PROGRESS NOTES
Subjective:       Patient ID: José Miguel Nugent is a 52 y.o. male.  Surgeon: Dr. Slick Dangelo  Radiation Oncologist: Dr. Jordan Bey  Surgeon in [place]/colonoscopy: Dr. Zaid Izquierdo     Rectal Cancer Stage IIA (T3N0M0) diagnosed 19  Biopsy/pathology: Colonoscopy done 19--ulcerated mass noted at rectosigmoid junction, mass was traversed and reached all the way to cecum, diverticulosis noted in sigmoid but no other abnormalities, mass began at 15cm biopsies done and rectal polyp noted at 10cm and removed. Pathology from tumor showed moderately differentiated adenocarcinoma, polyp c/w hyperplastic polyp.  S/p LAR on 1/15/20 by Dr. Dangelo--Minimal residual moderately differentiated adenocarcinoma, 9mm in greatest dimension (near complete response). Invades through the muscularis propria into pericolonic adipose tissue. Margins negative. No lymphatic invasion. 16 pericolonic lymph nodes negative for carcinoma. [id number] present measuring 9 mm.   Imagin. MRI pelvis w/ and w/o contrast 10/11/19--a circumferential malignancy present at rectosigmoid junction and measures approximately 8cm in length, extends to approximately 7cm cranial to anal verge, tumor is full thickness and may invade through the rectal serosa but does not appear to invade adjacent perirectal fat, 3mm nodule present to left perirectal fat is concerning but indeterminate for early regional metastasis, no pelvic lymphadenopathy identified.  2. CT C/A with contrast done 10/11/19--2 mm right lower lobe pulmonary nodule is nonspecific, three subcentimeter hepatic hypodensities are too small to characterize, otherwise no definite evidence of metastatic disease in the chest or abdomen.  3. CT A/P done 20--slightly improved appearance of wall thickening around the rectosigmoid junction, small vague hepatic hypodensities show little change, no new suspicious findings.  4.  CT C/A/P done 20--stable right lower lobe pulmonary nodule,  scarring right middle lobe and lingula, stable subcentimeter hypodensities in the liver too small to characterize unchanged, scattered atherosclerotic vascular calcifications, status post left 8 low anterior resection, small periumbilical hernia no evidence of residual recurrent or metastatic disease.  5. CT C/A/P done 1/22/21--No findings to suggest active or progressive disease in the chest abdomen or pelvis.  6. CT C/A/P done 7/23/21--No new suspicious findings in the chest, abdomen or pelvis.   7. CT C/A/P done 1/24/22--No new or worsening malignant findings identified since 7/23/2021.  8. CT C/A/P done 7/20/22--No new suspicious findings chest, abdomen or pelvis, stable small bilateral hepatic hypodensities.     CEA:  09/23/19--2.5  02/26/20--1.5  05/20/20--3.2  07/13/20-- 1.82  01/22/21--1.88  07/23/21--1.76  10/21/21--1.82  01/24/22--1.77  07/20/22--<1.73     Procedures:  Mediport placed by Dr. Dangelo 2/21/20. Removed 9/2021.   Colonoscopy 1/23/21--negative, repeat recommended in 1 year.  Colonoscopy 1/14/22--sigmoid diverticulosis, no evidence of recurrence.      Treatment History:  1. Neoadjuvant Xeloda/XRT. 10/28/19 - 12/6/19.   2. Adjuvant XELOX x 6 cycles started on 2/26/20. Xeloda dose reduced to 1500mg PO BID on 3/3/20. Oxaliplatin reduced to 100mg/m2 for Cycle 5 due to neuropathy.   Cycle 6 with Xeloda only completed on 7/23/20.      Treatment plan:  Observation.      Chief Complaint: Other Misc (Pt reports no new concerns today.)    HPI   Patient presents for follow-up of rectal cancer. No complaints. Feels great. Recent labs and scan good and I went over results with patient.     Past Medical History:   Diagnosis Date    Colon cancer 2019    History of chemotherapy     History of Nissen fundoplication     History of radiation therapy     Hypertension     Sleep apnea     cpap used      Review of patient's allergies indicates:  No Known Allergies   Current Outpatient Medications on File Prior  to Visit   Medication Sig Dispense Refill    co-enzyme Q-10 50 mg capsule Take 50 mg by mouth once daily.      FLUoxetine 20 MG tablet       lisinopriL-hydrochlorothiazide (PRINZIDE,ZESTORETIC) 20-12.5 mg per tablet Take 1 tablet by mouth every morning.      multivit-min/ferrous fumarate (MULTI VITAMIN ORAL) Take 1 tablet by mouth once daily.      omega-3 fatty acids/fish oil (FISH OIL-OMEGA-3 FATTY ACIDS) 300-1,000 mg capsule Take 1 capsule by mouth once daily.      RED YEAST RICE ORAL Take 1 tablet by mouth once daily.      testosterone cypionate (DEPOTESTOTERONE CYPIONATE) 200 mg/mL injection INJECT 1 ML AT MD OFFICE EVERY OTHER WEEK      vitamin D (VITAMIN D3) 1000 units Tab Take 1,000 Units by mouth once daily.       No current facility-administered medications on file prior to visit.      Review of Systems   Constitutional: Negative for appetite change, fatigue, fever and unexpected weight change.   HENT: Negative for mouth sores.    Eyes: Negative.    Respiratory: Negative for cough and shortness of breath.    Cardiovascular: Negative for chest pain and leg swelling.   Gastrointestinal: Negative for abdominal distention, abdominal pain, constipation, diarrhea, nausea, vomiting and reflux.   Genitourinary: Negative for difficulty urinating, dysuria and hematuria.   Musculoskeletal: Negative for arthralgias and back pain.   Integumentary:  Negative for rash.   Neurological: Negative for weakness and headaches.   Hematological: Negative for adenopathy.   Psychiatric/Behavioral: Negative for sleep disturbance. The patient is not nervous/anxious.               Physical Exam  Constitutional:       General: He is awake.      Appearance: Normal appearance.   HENT:      Head: Normocephalic and atraumatic.      Nose: Nose normal.      Mouth/Throat:      Mouth: Mucous membranes are moist.   Eyes:      General: Vision grossly intact.      Extraocular Movements: Extraocular movements intact.       Conjunctiva/sclera: Conjunctivae normal.   Cardiovascular:      Rate and Rhythm: Normal rate and regular rhythm.      Heart sounds: Normal heart sounds.   Pulmonary:      Effort: Pulmonary effort is normal.      Breath sounds: Normal breath sounds.   Chest:   Breasts:      Right: No supraclavicular adenopathy.      Left: No supraclavicular adenopathy.       Abdominal:      General: Bowel sounds are normal. There is no distension.      Palpations: Abdomen is soft.      Tenderness: There is no abdominal tenderness.      Comments: Scars from surgery healed    Musculoskeletal:      Cervical back: Normal range of motion and neck supple.      Right lower leg: No edema.      Left lower leg: No edema.   Lymphadenopathy:      Cervical: No cervical adenopathy.      Upper Body:      Right upper body: No supraclavicular adenopathy.      Left upper body: No supraclavicular adenopathy.   Skin:     General: Skin is warm.   Neurological:      Mental Status: He is alert and oriented to person, place, and time.      Motor: Motor function is intact.   Psychiatric:         Mood and Affect: Mood normal.         Speech: Speech normal.         Behavior: Behavior is cooperative.         Judgment: Judgment normal.         Hospital Outpatient Visit on 07/20/2022   Component Date Value    POC Creatinine 07/20/2022 1.2     Sample 07/20/2022 unknown    Orders Only on 07/20/2022   Component Date Value    Carcinoembryonic Antigen 07/20/2022 <1.73     Sodium Level 07/20/2022 136     Potassium Level 07/20/2022 4.2     Chloride 07/20/2022 102     Carbon Dioxide 07/20/2022 26     Glucose Level 07/20/2022 101 (A)    Blood Urea Nitrogen 07/20/2022 13.1     Creatinine 07/20/2022 1.23 (A)    Calcium Level Total 07/20/2022 9.3     Protein Total 07/20/2022 6.3 (A)    Albumin Level 07/20/2022 4.0     Globulin 07/20/2022 2.3 (A)    Albumin/Globulin Ratio 07/20/2022 1.7     Bilirubin Total 07/20/2022 0.5     Alkaline Phosphatase 07/20/2022 55      Alanine Aminotransferase 07/20/2022 26     Aspartate Aminotransfera* 07/20/2022 23     Estimated GFR-Non Deborah* 07/20/2022 >60     WBC 07/20/2022 6.5     RBC 07/20/2022 4.43 (A)    Hgb 07/20/2022 14.2     Hct 07/20/2022 42.5     MCV 07/20/2022 95.9 (A)    MCH 07/20/2022 32.1 (A)    MCHC 07/20/2022 33.4     RDW 07/20/2022 13.3     Platelet 07/20/2022 257     MPV 07/20/2022 8.9     Neut % 07/20/2022 70.3     Lymph % 07/20/2022 16.2     Mono % 07/20/2022 11.3     Eos % 07/20/2022 1.5     Basophil % 07/20/2022 0.5     Lymph # 07/20/2022 1.06     Neut # 07/20/2022 4.6     Mono # 07/20/2022 0.74     Eos # 07/20/2022 0.10     Baso # 07/20/2022 0.03     IG# 07/20/2022 0.01     IG% 07/20/2022 0.2             Assessment:       1. Rectal cancer         Plan:       Patient with rectal cancer diagnosed 9/23/19, appears to be stage IIA (T3N0M0) by staging studies, MRI pelvis and CT C/A.  There is an indeterminate perirectal nodule too small to characterize but no lymphadenopathy, a few small hypodense liver lesions, likely cysts and a 2mm lung nodule but no obvious metastatic disease.  Per NCCN guidelines, I have recommended treatment with neoadjuvant Xeloda/XRT.  Patient was seen by Dr. Dangelo who recommended the same.     Patient started Xeloda 1650mg PO BID and XRT on 10/28/19. Completed on 12/6/19.   He tolerated very well.   CT A/P repeat done 1/2/20 shows slight improvement of wall thickening around the rectosigmoid junction, otherwise no new findings.     S/p LAR on 1/15/20 by Dr. Dangelo. Minimal residual moderately differentiated adenocarcinoma, 9mm in greatest dimension (near complete response). Invades through the muscularis propria into pericolonic adipose tissue. Margins negative. No lymphatic invasion. 16 pericolonic lymph nodes negative for carcinoma. [id number] present measuring 9 mm.   Near complete response!   Adjuvant XELOX x 6 cycles recommended.  XELOX cycle 1 started 2/26/20.  Patient had a difficult time tolerating with severe HA and nausea.  Xeloda reduced to 1500mg PO BID on 3/3/20 during cycle 1 and Emend added with cycle 2.  He started having more neuropathy that was painful at times so the Oxaliplatin dose was reduced with Cycle 5. His neuropathy continued without any change despite the dose reduction.   Cycle 6 completed on 6/23/20 with Xeloda only (Omitted the Oxaliplatin).      Patient currently without any clinical signs or laboratory evidence of disease recurrence.   Labs recent all good. He has on and off mild anemia but H/H remains good this time. Liver enzymes also normal, he has known fatty liver, CEA remains WNL.    Colonoscopy done 1/15/22 good. Discussed will not need repeat for 3 years since no polyps.    CT C/A/P from 7/20/22 with JOSUE.   Continue surveillance visits.   RTC 6 months for follow-up with visit and labs and repeat CT C/A/P.     Plan for continued observation with CT scans every 6 months x 5 years.      All questions answered at this time.        Brandi Cruz MD

## 2022-07-26 ENCOUNTER — OFFICE VISIT (OUTPATIENT)
Dept: HEMATOLOGY/ONCOLOGY | Facility: CLINIC | Age: 52
End: 2022-07-26
Payer: COMMERCIAL

## 2022-07-26 VITALS
DIASTOLIC BLOOD PRESSURE: 74 MMHG | WEIGHT: 220.88 LBS | HEIGHT: 71 IN | TEMPERATURE: 98 F | OXYGEN SATURATION: 98 % | BODY MASS INDEX: 30.92 KG/M2 | RESPIRATION RATE: 14 BRPM | SYSTOLIC BLOOD PRESSURE: 132 MMHG | HEART RATE: 73 BPM

## 2022-07-26 DIAGNOSIS — C20 RECTAL CANCER: ICD-10-CM

## 2022-07-26 PROCEDURE — 3078F DIAST BP <80 MM HG: CPT | Mod: CPTII,S$GLB,, | Performed by: INTERNAL MEDICINE

## 2022-07-26 PROCEDURE — 1159F PR MEDICATION LIST DOCUMENTED IN MEDICAL RECORD: ICD-10-PCS | Mod: CPTII,S$GLB,, | Performed by: INTERNAL MEDICINE

## 2022-07-26 PROCEDURE — 99214 OFFICE O/P EST MOD 30 MIN: CPT | Mod: S$GLB,,, | Performed by: INTERNAL MEDICINE

## 2022-07-26 PROCEDURE — 3075F PR MOST RECENT SYSTOLIC BLOOD PRESS GE 130-139MM HG: ICD-10-PCS | Mod: CPTII,S$GLB,, | Performed by: INTERNAL MEDICINE

## 2022-07-26 PROCEDURE — 4010F PR ACE/ARB THEARPY RXD/TAKEN: ICD-10-PCS | Mod: CPTII,S$GLB,, | Performed by: INTERNAL MEDICINE

## 2022-07-26 PROCEDURE — 3008F BODY MASS INDEX DOCD: CPT | Mod: CPTII,S$GLB,, | Performed by: INTERNAL MEDICINE

## 2022-07-26 PROCEDURE — 99999 PR PBB SHADOW E&M-EST. PATIENT-LVL IV: CPT | Mod: PBBFAC,,, | Performed by: INTERNAL MEDICINE

## 2022-07-26 PROCEDURE — 1160F PR REVIEW ALL MEDS BY PRESCRIBER/CLIN PHARMACIST DOCUMENTED: ICD-10-PCS | Mod: CPTII,S$GLB,, | Performed by: INTERNAL MEDICINE

## 2022-07-26 PROCEDURE — 3075F SYST BP GE 130 - 139MM HG: CPT | Mod: CPTII,S$GLB,, | Performed by: INTERNAL MEDICINE

## 2022-07-26 PROCEDURE — 99999 PR PBB SHADOW E&M-EST. PATIENT-LVL IV: ICD-10-PCS | Mod: PBBFAC,,, | Performed by: INTERNAL MEDICINE

## 2022-07-26 PROCEDURE — 1159F MED LIST DOCD IN RCRD: CPT | Mod: CPTII,S$GLB,, | Performed by: INTERNAL MEDICINE

## 2022-07-26 PROCEDURE — 4010F ACE/ARB THERAPY RXD/TAKEN: CPT | Mod: CPTII,S$GLB,, | Performed by: INTERNAL MEDICINE

## 2022-07-26 PROCEDURE — 3078F PR MOST RECENT DIASTOLIC BLOOD PRESSURE < 80 MM HG: ICD-10-PCS | Mod: CPTII,S$GLB,, | Performed by: INTERNAL MEDICINE

## 2022-07-26 PROCEDURE — 99214 PR OFFICE/OUTPT VISIT, EST, LEVL IV, 30-39 MIN: ICD-10-PCS | Mod: S$GLB,,, | Performed by: INTERNAL MEDICINE

## 2022-07-26 PROCEDURE — 1160F RVW MEDS BY RX/DR IN RCRD: CPT | Mod: CPTII,S$GLB,, | Performed by: INTERNAL MEDICINE

## 2022-07-26 PROCEDURE — 3008F PR BODY MASS INDEX (BMI) DOCUMENTED: ICD-10-PCS | Mod: CPTII,S$GLB,, | Performed by: INTERNAL MEDICINE

## 2022-07-26 RX ORDER — TESTOSTERONE CYPIONATE 200 MG/ML
INJECTION, SOLUTION INTRAMUSCULAR
COMMUNITY
Start: 2022-05-09

## 2022-07-27 ENCOUNTER — PATIENT OUTREACH (OUTPATIENT)
Dept: ADMINISTRATIVE | Facility: HOSPITAL | Age: 52
End: 2022-07-27
Payer: COMMERCIAL

## 2022-07-27 NOTE — PROGRESS NOTES
Population Health Outreach.Records Received, hyper-linked into chart at this time. The following record(s)  below were uploaded for Health Maintenance .         1/25/21 COLONOSCOPY

## 2023-01-26 ENCOUNTER — HOSPITAL ENCOUNTER (OUTPATIENT)
Dept: RADIOLOGY | Facility: HOSPITAL | Age: 53
Discharge: HOME | End: 2023-01-26
Attending: INTERNAL MEDICINE
Payer: COMMERCIAL

## 2023-01-26 DIAGNOSIS — C20 RECTAL CANCER: ICD-10-CM

## 2023-01-26 LAB
CREAT SERPL-MCNC: 1.5 MG/DL (ref 0.5–1.4)
SAMPLE: ABNORMAL

## 2023-01-26 PROCEDURE — 25500020 PHARM REV CODE 255: Performed by: INTERNAL MEDICINE

## 2023-01-26 PROCEDURE — 71260 CT THORAX DX C+: CPT | Mod: TC

## 2023-01-26 PROCEDURE — 74177 CT ABD & PELVIS W/CONTRAST: CPT | Mod: TC

## 2023-01-26 RX ADMIN — DIATRIZOATE MEGLUMINE AND DIATRIZOATE SODIUM 30 ML: 660; 100 LIQUID ORAL; RECTAL at 10:01

## 2023-01-26 RX ADMIN — IOPAMIDOL 100 ML: 755 INJECTION, SOLUTION INTRAVENOUS at 11:01

## 2023-01-26 NOTE — PROGRESS NOTES
Subjective:       Patient ID: José Miguel Nugent is a 52 y.o. male.  Surgeon: Dr. Slick Dangelo  Radiation Oncologist: Dr. Jordan Bey  Surgeon in [place]/colonoscopy: Dr. Zaid Izquierdo     Rectal Cancer Stage IIA (T3N0M0) diagnosed 19  Biopsy/pathology: Colonoscopy done 19--ulcerated mass noted at rectosigmoid junction, mass was traversed and reached all the way to cecum, diverticulosis noted in sigmoid but no other abnormalities, mass began at 15cm biopsies done and rectal polyp noted at 10cm and removed. Pathology from tumor showed moderately differentiated adenocarcinoma, polyp c/w hyperplastic polyp.  Surgery/pathology:  S/p LAR on 1/15/20 by Dr. Dangelo--Minimal residual moderately differentiated adenocarcinoma, 9mm in greatest dimension (near complete response). Invades through the muscularis propria into pericolonic adipose tissue. Margins negative. No lymphatic invasion. 16 pericolonic lymph nodes negative for carcinoma. [id number] present measuring 9 mm.   Imagin. MRI pelvis w/ and w/o contrast 10/11/19--a circumferential malignancy present at rectosigmoid junction and measures approximately 8cm in length, extends to approximately 7cm cranial to anal verge, tumor is full thickness and may invade through the rectal serosa but does not appear to invade adjacent perirectal fat, 3mm nodule present to left perirectal fat is concerning but indeterminate for early regional metastasis, no pelvic lymphadenopathy identified.  2. CT C/A with contrast done 10/11/19--2 mm right lower lobe pulmonary nodule is nonspecific, three subcentimeter hepatic hypodensities are too small to characterize, otherwise no definite evidence of metastatic disease in the chest or abdomen.  3. CT A/P done 20--slightly improved appearance of wall thickening around the rectosigmoid junction, small vague hepatic hypodensities show little change, no new suspicious findings.  4.  CT C/A/P done 20--stable right lower  lobe pulmonary nodule, scarring right middle lobe and lingula, stable subcentimeter hypodensities in the liver too small to characterize unchanged, scattered atherosclerotic vascular calcifications, status post left 8 low anterior resection, small periumbilical hernia no evidence of residual recurrent or metastatic disease.  5. CT C/A/P done 1/22/21--No findings to suggest active or progressive disease in the chest abdomen or pelvis.  6. CT C/A/P done 7/23/21--No new suspicious findings in the chest, abdomen or pelvis.   7. CT C/A/P done 1/24/22--No new or worsening malignant findings identified since 7/23/2021.  8. CT C/A/P done 7/20/22--No new suspicious findings chest, abdomen or pelvis, stable small bilateral hepatic hypodensities.  9. CT C/A/P done 1/26/23--No new or worsening findings.     CEA:  09/23/19--2.5  02/26/20--1.5  05/20/20--3.2  07/13/20-- 1.82  01/22/21--1.88  07/23/21--1.76  10/21/21--1.82  01/24/22--1.77  07/20/22--<1.73  1/26/23--2.45     Procedures:  Mediport placed by Dr. Dangelo 2/21/20. Removed 9/2021.   Colonoscopy 1/23/21--negative, repeat recommended in 1 year.  Colonoscopy 1/14/22--sigmoid diverticulosis, no evidence of recurrence.      Treatment History:  1. Neoadjuvant Xeloda/XRT. 10/28/19 - 12/6/19.   2. Adjuvant XELOX x 6 cycles started on 2/26/20. Xeloda dose reduced to 1500mg PO BID on 3/3/20. Oxaliplatin reduced to 100mg/m2 for Cycle 5 due to neuropathy.   Cycle 6 with Xeloda only completed on 7/23/20.      Treatment plan:  Observation.      Chief Complaint: Other Misc (Pt reports no new concerns today.)    HPI   Patient presents for follow-up of rectal cancer. No complaints. Feels great. Recent labs and scan good and I went over results with patient.     Past Medical History:   Diagnosis Date    Colon cancer 2019    History of chemotherapy     History of Nissen fundoplication     History of radiation therapy     Hypertension     Sleep apnea     cpap used      Review of patient's  allergies indicates:  No Known Allergies   Current Outpatient Medications on File Prior to Visit   Medication Sig Dispense Refill    co-enzyme Q-10 50 mg capsule Take 50 mg by mouth once daily.      FLUoxetine 20 MG tablet       lisinopriL-hydrochlorothiazide (PRINZIDE,ZESTORETIC) 20-12.5 mg per tablet Take 1 tablet by mouth every morning.      multivit-min/ferrous fumarate (MULTI VITAMIN ORAL) Take 1 tablet by mouth once daily.      omega-3 fatty acids/fish oil (FISH OIL-OMEGA-3 FATTY ACIDS) 300-1,000 mg capsule Take 1 capsule by mouth once daily.      pravastatin (PRAVACHOL) 10 MG tablet Take 10 mg by mouth.      testosterone cypionate (DEPOTESTOTERONE CYPIONATE) 200 mg/mL injection INJECT 1 ML AT MD OFFICE EVERY OTHER WEEK      [DISCONTINUED] RED YEAST RICE ORAL Take 1 tablet by mouth once daily.      vitamin D (VITAMIN D3) 1000 units Tab Take 1,000 Units by mouth once daily.       No current facility-administered medications on file prior to visit.      Review of Systems   Constitutional:  Negative for appetite change, fatigue, fever and unexpected weight change.   HENT:  Negative for mouth sores.    Eyes: Negative.    Respiratory:  Negative for cough and shortness of breath.    Cardiovascular:  Negative for chest pain and leg swelling.   Gastrointestinal:  Negative for abdominal distention, abdominal pain, constipation, diarrhea, nausea, vomiting and reflux.   Genitourinary:  Negative for difficulty urinating, dysuria and hematuria.   Musculoskeletal:  Negative for arthralgias and back pain.   Integumentary:  Negative for rash.   Neurological:  Negative for weakness and headaches.   Hematological:  Negative for adenopathy.   Psychiatric/Behavioral:  Negative for sleep disturbance. The patient is not nervous/anxious.        Vitals:    01/30/23 1012   BP: 134/87   Pulse: 96   Resp: 14        Physical Exam  Constitutional:       General: He is awake.      Appearance: Normal appearance.   HENT:      Head:  Normocephalic and atraumatic.      Nose: Nose normal.      Mouth/Throat:      Mouth: Mucous membranes are moist.   Eyes:      General: Vision grossly intact.      Extraocular Movements: Extraocular movements intact.      Conjunctiva/sclera: Conjunctivae normal.   Cardiovascular:      Rate and Rhythm: Normal rate and regular rhythm.      Heart sounds: Normal heart sounds.   Pulmonary:      Effort: Pulmonary effort is normal.      Breath sounds: Normal breath sounds.   Abdominal:      General: Bowel sounds are normal. There is no distension.      Palpations: Abdomen is soft.      Tenderness: There is no abdominal tenderness.      Comments: Scars from surgery healed    Musculoskeletal:      Cervical back: Normal range of motion and neck supple.      Right lower leg: No edema.      Left lower leg: No edema.   Lymphadenopathy:      Cervical: No cervical adenopathy.      Upper Body:      Right upper body: No supraclavicular adenopathy.      Left upper body: No supraclavicular adenopathy.   Skin:     General: Skin is warm.   Neurological:      Mental Status: He is alert and oriented to person, place, and time.      Motor: Motor function is intact.   Psychiatric:         Mood and Affect: Mood normal.         Speech: Speech normal.         Behavior: Behavior is cooperative.         Judgment: Judgment normal.       Hospital Outpatient Visit on 01/26/2023   Component Date Value    POC Creatinine 01/26/2023 1.5 (H)     Sample 01/26/2023 unknown    Lab Visit on 01/26/2023   Component Date Value    Carcinoembryonic Antigen 01/26/2023 2.45     Sodium Level 01/26/2023 140     Potassium Level 01/26/2023 4.4     Chloride 01/26/2023 102     Carbon Dioxide 01/26/2023 29     Glucose Level 01/26/2023 92     Blood Urea Nitrogen 01/26/2023 14.5     Creatinine 01/26/2023 1.40 (H)     Calcium Level Total 01/26/2023 10.0     Protein Total 01/26/2023 6.9     Albumin Level 01/26/2023 4.2     Globulin 01/26/2023 2.7     Albumin/Globulin Ratio  01/26/2023 1.6     Bilirubin Total 01/26/2023 0.7     Alkaline Phosphatase 01/26/2023 52     Alanine Aminotransferase 01/26/2023 37     Aspartate Aminotransfera* 01/26/2023 25     eGFR 01/26/2023 60     WBC 01/26/2023 5.9     RBC 01/26/2023 5.09     Hgb 01/26/2023 16.3     Hct 01/26/2023 49.9     MCV 01/26/2023 98.0 (H)     MCH 01/26/2023 32.0     MCHC 01/26/2023 32.7 (L)     RDW 01/26/2023 12.6     Platelet 01/26/2023 254     MPV 01/26/2023 8.8     Neut % 01/26/2023 67.0     Lymph % 01/26/2023 21.3     Mono % 01/26/2023 8.9     Eos % 01/26/2023 2.0     Basophil % 01/26/2023 0.5     Lymph # 01/26/2023 1.25     Neut # 01/26/2023 3.93     Mono # 01/26/2023 0.52     Eos # 01/26/2023 0.12     Baso # 01/26/2023 0.03     IG# 01/26/2023 0.02     IG% 01/26/2023 0.3           Assessment:       1. Rectal cancer      Plan:       Patient with rectal cancer diagnosed 9/23/19, appears to be stage IIA (T3N0M0) by staging studies, MRI pelvis and CT C/A.  There is an indeterminate perirectal nodule too small to characterize but no lymphadenopathy, a few small hypodense liver lesions, likely cysts and a 2mm lung nodule but no obvious metastatic disease.  Per NCCN guidelines, recommended treatment with neoadjuvant Xeloda/XRT.  Patient was seen by Dr. Dangelo who recommended the same.     Patient started Xeloda 1650mg PO BID and XRT on 10/28/19. Completed on 12/6/19.   He tolerated very well.   CT A/P repeat done 1/2/20 shows slight improvement of wall thickening around the rectosigmoid junction, otherwise no new findings.     S/p LAR on 1/15/20 by Dr. Dangelo. Minimal residual moderately differentiated adenocarcinoma, 9mm in greatest dimension (near complete response). Invades through the muscularis propria into pericolonic adipose tissue. Margins negative. No lymphatic invasion. 16 pericolonic lymph nodes negative for carcinoma. [id number] present measuring 9 mm.   Near complete response!   Adjuvant XELOX x 6 cycles  recommended.  XELOX cycle 1 started 2/26/20. Patient had a difficult time tolerating with severe HA and nausea.  Xeloda reduced to 1500mg PO BID on 3/3/20 during cycle 1 and Emend added with cycle 2.  He started having more neuropathy that was painful at times so the Oxaliplatin dose was reduced with Cycle 5. His neuropathy continued without any change despite the dose reduction.   Cycle 6 completed on 6/23/20 with Xeloda only (Omitted the Oxaliplatin).      Patient currently without any clinical signs or laboratory evidence of disease recurrence.   Labs recent all good, aside from mildly elevated creatinine. Liver enzymes also normal, he has known fatty liver, CEA remains WNL.    Colonoscopy done 1/15/22 good. Discussed will not need repeat for 3 years since no polyps. Due 1/2025.    CT C/A/P from 1/26/23 with JOSUE.   Continue surveillance visits.   RTC 6 months for follow-up with visit and labs and repeat CT C/A/P.     Plan for continued observation with CT scans every 6 months x 5 years. Will complete in 1/2025.      All questions answered at this time.        Brandi Cruz MD

## 2023-01-30 ENCOUNTER — OFFICE VISIT (OUTPATIENT)
Dept: HEMATOLOGY/ONCOLOGY | Facility: CLINIC | Age: 53
End: 2023-01-30
Payer: COMMERCIAL

## 2023-01-30 VITALS
OXYGEN SATURATION: 98 % | HEIGHT: 71 IN | DIASTOLIC BLOOD PRESSURE: 87 MMHG | RESPIRATION RATE: 14 BRPM | HEART RATE: 96 BPM | SYSTOLIC BLOOD PRESSURE: 134 MMHG | BODY MASS INDEX: 30.89 KG/M2 | WEIGHT: 220.63 LBS

## 2023-01-30 DIAGNOSIS — C20 RECTAL CANCER: Primary | ICD-10-CM

## 2023-01-30 PROCEDURE — 1159F PR MEDICATION LIST DOCUMENTED IN MEDICAL RECORD: ICD-10-PCS | Mod: CPTII,S$GLB,, | Performed by: INTERNAL MEDICINE

## 2023-01-30 PROCEDURE — 99214 OFFICE O/P EST MOD 30 MIN: CPT | Mod: S$GLB,,, | Performed by: INTERNAL MEDICINE

## 2023-01-30 PROCEDURE — 3075F PR MOST RECENT SYSTOLIC BLOOD PRESS GE 130-139MM HG: ICD-10-PCS | Mod: CPTII,S$GLB,, | Performed by: INTERNAL MEDICINE

## 2023-01-30 PROCEDURE — 1159F MED LIST DOCD IN RCRD: CPT | Mod: CPTII,S$GLB,, | Performed by: INTERNAL MEDICINE

## 2023-01-30 PROCEDURE — 1160F RVW MEDS BY RX/DR IN RCRD: CPT | Mod: CPTII,S$GLB,, | Performed by: INTERNAL MEDICINE

## 2023-01-30 PROCEDURE — 99999 PR PBB SHADOW E&M-EST. PATIENT-LVL IV: ICD-10-PCS | Mod: PBBFAC,,, | Performed by: INTERNAL MEDICINE

## 2023-01-30 PROCEDURE — 99214 PR OFFICE/OUTPT VISIT, EST, LEVL IV, 30-39 MIN: ICD-10-PCS | Mod: S$GLB,,, | Performed by: INTERNAL MEDICINE

## 2023-01-30 PROCEDURE — 3008F PR BODY MASS INDEX (BMI) DOCUMENTED: ICD-10-PCS | Mod: CPTII,S$GLB,, | Performed by: INTERNAL MEDICINE

## 2023-01-30 PROCEDURE — 3008F BODY MASS INDEX DOCD: CPT | Mod: CPTII,S$GLB,, | Performed by: INTERNAL MEDICINE

## 2023-01-30 PROCEDURE — 99999 PR PBB SHADOW E&M-EST. PATIENT-LVL IV: CPT | Mod: PBBFAC,,, | Performed by: INTERNAL MEDICINE

## 2023-01-30 PROCEDURE — 3075F SYST BP GE 130 - 139MM HG: CPT | Mod: CPTII,S$GLB,, | Performed by: INTERNAL MEDICINE

## 2023-01-30 PROCEDURE — 1160F PR REVIEW ALL MEDS BY PRESCRIBER/CLIN PHARMACIST DOCUMENTED: ICD-10-PCS | Mod: CPTII,S$GLB,, | Performed by: INTERNAL MEDICINE

## 2023-01-30 PROCEDURE — 4010F ACE/ARB THERAPY RXD/TAKEN: CPT | Mod: CPTII,S$GLB,, | Performed by: INTERNAL MEDICINE

## 2023-01-30 PROCEDURE — 3079F PR MOST RECENT DIASTOLIC BLOOD PRESSURE 80-89 MM HG: ICD-10-PCS | Mod: CPTII,S$GLB,, | Performed by: INTERNAL MEDICINE

## 2023-01-30 PROCEDURE — 4010F PR ACE/ARB THEARPY RXD/TAKEN: ICD-10-PCS | Mod: CPTII,S$GLB,, | Performed by: INTERNAL MEDICINE

## 2023-01-30 PROCEDURE — 3079F DIAST BP 80-89 MM HG: CPT | Mod: CPTII,S$GLB,, | Performed by: INTERNAL MEDICINE

## 2023-01-30 RX ORDER — PRAVASTATIN SODIUM 10 MG/1
10 TABLET ORAL
COMMUNITY
Start: 2023-01-19

## 2023-06-05 ENCOUNTER — PATIENT MESSAGE (OUTPATIENT)
Dept: HEMATOLOGY/ONCOLOGY | Facility: CLINIC | Age: 53
End: 2023-06-05
Payer: COMMERCIAL

## 2023-07-21 PROBLEM — I10 HYPERTENSION: Status: ACTIVE | Noted: 2023-07-21

## 2023-07-21 NOTE — PROGRESS NOTES
Subjective:       Patient ID: José Miguel Nugent is a 53 y.o. male.  Surgeon: Dr. Slick Dangelo  Radiation Oncologist: Dr. Jordan Bey  Surgeon in [place]/colonoscopy: Dr. Zaid Izquierdo     Rectal Cancer Stage IIA (T3N0M0) diagnosed 19  Biopsy/pathology: Colonoscopy done 19--ulcerated mass noted at rectosigmoid junction, mass was traversed and reached all the way to cecum, diverticulosis noted in sigmoid but no other abnormalities, mass began at 15cm biopsies done and rectal polyp noted at 10cm and removed. Pathology from tumor showed moderately differentiated adenocarcinoma, polyp c/w hyperplastic polyp.  Surgery/pathology:  S/p LAR on 1/15/20 by Dr. Dangelo--Minimal residual moderately differentiated adenocarcinoma, 9mm in greatest dimension (near complete response). Invades through the muscularis propria into pericolonic adipose tissue. Margins negative. No lymphatic invasion. 16 pericolonic lymph nodes negative for carcinoma. [id number] present measuring 9 mm.   Imagin. MRI pelvis w/ and w/o contrast 10/11/19--a circumferential malignancy present at rectosigmoid junction and measures approximately 8cm in length, extends to approximately 7cm cranial to anal verge, tumor is full thickness and may invade through the rectal serosa but does not appear to invade adjacent perirectal fat, 3mm nodule present to left perirectal fat is concerning but indeterminate for early regional metastasis, no pelvic lymphadenopathy identified.  2. CT C/A with contrast done 10/11/19--2 mm right lower lobe pulmonary nodule is nonspecific, three subcentimeter hepatic hypodensities are too small to characterize, otherwise no definite evidence of metastatic disease in the chest or abdomen.  3. CT A/P done 20--slightly improved appearance of wall thickening around the rectosigmoid junction, small vague hepatic hypodensities show little change, no new suspicious findings.  4.  CT C/A/P done 20--stable right lower  lobe pulmonary nodule, scarring right middle lobe and lingula, stable subcentimeter hypodensities in the liver too small to characterize unchanged, scattered atherosclerotic vascular calcifications, status post left 8 low anterior resection, small periumbilical hernia no evidence of residual recurrent or metastatic disease.  5. CT C/A/P done 1/22/21--No findings to suggest active or progressive disease in the chest abdomen or pelvis.  6. CT C/A/P done 7/23/21--No new suspicious findings in the chest, abdomen or pelvis.   7. CT C/A/P done 1/24/22--No new or worsening malignant findings identified since 7/23/2021.  8. CT C/A/P done 7/20/22--No new suspicious findings chest, abdomen or pelvis, stable small bilateral hepatic hypodensities.  9. CT C/A/P done 1/26/23--No new or worsening findings.  10. CT C/A/P on 7/26/23--No change from previous exam. Small enhancing focus at the anterior left lobe of the liver without detrimental change.     CEA:  09/23/19--2.5  02/26/20--1.5  05/20/20--3.2  07/13/20-- 1.82  01/22/21--1.88  07/23/21--1.76  10/21/21--1.82  01/24/22--1.77  07/20/22--<1.73  01/26/23--2.45  07/26/23--3.02     Procedures:  Mediport placed by Dr. Dangelo 2/21/20. Removed 9/2021.   Colonoscopy 1/23/21--negative, repeat recommended in 1 year.  Colonoscopy 1/14/22--sigmoid diverticulosis, no evidence of recurrence.      Treatment History:  1. Neoadjuvant Xeloda/XRT. 10/28/19 - 12/6/19.   2. Adjuvant XELOX x 6 cycles started on 2/26/20. Xeloda dose reduced to 1500mg PO BID on 3/3/20. Oxaliplatin reduced to 100mg/m2 for Cycle 5 due to neuropathy.   Cycle 6 with Xeloda only completed on 7/23/20.      Treatment plan:  Observation. MRI of A/P liver protocol.      Chief Complaint: Other Misc (Pt reports no new concerns today.)    HPI   Patient presents for follow-up of rectal cancer. No complaints. Feels great. Leaving for [place] tomorrow. He has been doing well. Recent CT scan stable. Labs good with exception of  mildly increased CEA at 3.02. Denies any n/v/c/d. Denies any abdominal pain. We discussed obtaining MRI of A/P liver protocol to evaluate the enhancing focus and he is in agreement. No new problems reported.     Past Medical History:   Diagnosis Date    Colon cancer 2019    History of chemotherapy     History of Nissen fundoplication     History of radiation therapy     Hypertension     Sleep apnea     cpap used      Review of patient's allergies indicates:  No Known Allergies   Current Outpatient Medications on File Prior to Visit   Medication Sig Dispense Refill    amoxicillin (AMOXIL) 875 MG tablet Take 875 mg by mouth 2 (two) times daily.      FLUoxetine 20 MG tablet       lisinopriL-hydrochlorothiazide (PRINZIDE,ZESTORETIC) 20-12.5 mg per tablet Take 1 tablet by mouth every morning.      multivit-min/ferrous fumarate (MULTI VITAMIN ORAL) Take 1 tablet by mouth once daily.      omega-3 fatty acids/fish oil (FISH OIL-OMEGA-3 FATTY ACIDS) 300-1,000 mg capsule Take 1 capsule by mouth once daily.      pravastatin (PRAVACHOL) 10 MG tablet Take 10 mg by mouth.      testosterone cypionate (DEPOTESTOTERONE CYPIONATE) 200 mg/mL injection INJECT 1 ML AT MD OFFICE EVERY OTHER WEEK      co-enzyme Q-10 50 mg capsule Take 50 mg by mouth once daily.      tadalafiL (CIALIS) 20 MG Tab Take 20 mg by mouth.      vitamin D (VITAMIN D3) 1000 units Tab Take 1,000 Units by mouth once daily.       No current facility-administered medications on file prior to visit.      Review of Systems   Constitutional:  Negative for appetite change, fatigue, fever and unexpected weight change.   HENT:  Negative for mouth sores.    Eyes: Negative.  Negative for visual disturbance.   Respiratory:  Negative for cough and shortness of breath.    Cardiovascular:  Negative for chest pain and leg swelling.   Gastrointestinal:  Negative for abdominal distention, abdominal pain, constipation, diarrhea, nausea, vomiting and reflux.   Genitourinary:  Negative  for difficulty urinating, dysuria, frequency and hematuria.   Musculoskeletal:  Negative for arthralgias and back pain.   Integumentary:  Negative for rash.   Neurological:  Negative for weakness and headaches.   Hematological:  Negative for adenopathy.   Psychiatric/Behavioral:  Negative for sleep disturbance. The patient is not nervous/anxious.        Vitals:    07/28/23 0952   BP: 133/79   Pulse: 84   Resp: 14   Temp: 98 °F (36.7 °C)     Physical Exam  Constitutional:       General: He is awake.      Appearance: Normal appearance.   HENT:      Head: Normocephalic and atraumatic.      Nose: Nose normal.      Mouth/Throat:      Mouth: Mucous membranes are moist.   Eyes:      General: Vision grossly intact.      Extraocular Movements: Extraocular movements intact.      Conjunctiva/sclera: Conjunctivae normal.   Cardiovascular:      Rate and Rhythm: Normal rate and regular rhythm.      Heart sounds: Normal heart sounds.   Pulmonary:      Effort: Pulmonary effort is normal.      Breath sounds: Normal breath sounds.   Abdominal:      General: Bowel sounds are normal. There is no distension.      Palpations: Abdomen is soft.      Tenderness: There is no abdominal tenderness.      Comments: Scars from surgery healed    Musculoskeletal:      Cervical back: Normal range of motion and neck supple.      Right lower leg: No edema.      Left lower leg: No edema.   Lymphadenopathy:      Cervical: No cervical adenopathy.      Upper Body:      Right upper body: No supraclavicular adenopathy.      Left upper body: No supraclavicular adenopathy.   Skin:     General: Skin is warm.   Neurological:      Mental Status: He is alert and oriented to person, place, and time.      Motor: Motor function is intact.   Psychiatric:         Mood and Affect: Mood normal.         Speech: Speech normal.         Behavior: Behavior is cooperative.         Judgment: Judgment normal.     Hospital Outpatient Visit on 07/26/2023   Component Date Value     POC Creatinine 07/26/2023 1.7 (H)     Sample 07/26/2023 unknown    Lab Visit on 07/26/2023   Component Date Value    Sodium Level 07/26/2023 135 (L)     Potassium Level 07/26/2023 4.5     Chloride 07/26/2023 99     Carbon Dioxide 07/26/2023 31 (H)     Glucose Level 07/26/2023 89     Blood Urea Nitrogen 07/26/2023 13.9     Creatinine 07/26/2023 1.39 (H)     Calcium Level Total 07/26/2023 9.6     Protein Total 07/26/2023 6.9     Albumin Level 07/26/2023 4.3     Globulin 07/26/2023 2.6     Albumin/Globulin Ratio 07/26/2023 1.7     Bilirubin Total 07/26/2023 1.0     Alkaline Phosphatase 07/26/2023 49     Alanine Aminotransferase 07/26/2023 35     Aspartate Aminotransfera* 07/26/2023 26     eGFR 07/26/2023 60     Carcinoembryonic Antigen 07/26/2023 3.02 (H)     WBC 07/26/2023 6.05     RBC 07/26/2023 5.16     Hgb 07/26/2023 16.9     Hct 07/26/2023 51.2     MCV 07/26/2023 99.2 (H)     MCH 07/26/2023 32.8 (H)     MCHC 07/26/2023 33.0     RDW 07/26/2023 12.7     Platelet 07/26/2023 235     MPV 07/26/2023 8.8     Neut % 07/26/2023 64.5     Lymph % 07/26/2023 19.3     Mono % 07/26/2023 12.1     Eos % 07/26/2023 3.6     Basophil % 07/26/2023 0.2     Lymph # 07/26/2023 1.17     Neut # 07/26/2023 3.90     Mono # 07/26/2023 0.73     Eos # 07/26/2023 0.22     Baso # 07/26/2023 0.01     IG# 07/26/2023 0.02     IG% 07/26/2023 0.3           Assessment:       1. Malignant neoplasm of rectum    2. Elevated tumor markers      Plan:       Patient with rectal cancer diagnosed 9/23/19, appears to be stage IIA (T3N0M0) by staging studies, MRI pelvis and CT C/A.  There is an indeterminate perirectal nodule too small to characterize but no lymphadenopathy, a few small hypodense liver lesions, likely cysts and a 2mm lung nodule but no obvious metastatic disease.  Per NCCN guidelines, recommended treatment with neoadjuvant Xeloda/XRT.  Patient was seen by Dr. Dangelo who recommended the same.     Patient started Xeloda 1650mg PO BID and XRT on  10/28/19. Completed on 12/6/19.   He tolerated very well.   CT A/P repeat done 1/2/20 shows slight improvement of wall thickening around the rectosigmoid junction, otherwise no new findings.     S/p LAR on 1/15/20 by Dr. Dangelo. Minimal residual moderately differentiated adenocarcinoma, 9mm in greatest dimension (near complete response). Invades through the muscularis propria into pericolonic adipose tissue. Margins negative. No lymphatic invasion. 16 pericolonic lymph nodes negative for carcinoma. [id number] present measuring 9 mm.   Near complete response!   Adjuvant XELOX x 6 cycles recommended.  XELOX cycle 1 started 2/26/20. Patient had a difficult time tolerating with severe HA and nausea.  Xeloda reduced to 1500mg PO BID on 3/3/20 during cycle 1 and Emend added with cycle 2.  He started having more neuropathy that was painful at times so the Oxaliplatin dose was reduced with Cycle 5. His neuropathy continued without any change despite the dose reduction.   Cycle 6 completed on 6/23/20 with Xeloda only (Omitted the Oxaliplatin).      Patient currently without any clinical signs of disease recurrence.   Labs recent with mildly elevated creatinine which is not new. Liver enzymes normal, he has known fatty liver. CEA slightly elevated at 3.02.   CT of C/A/P with JOSUE, stable.   Last Colonoscopy done 1/15/22 good. Discussed will not need repeat for 3 years since no polyps. Due 1/2025.  Dr. Cruz wants to obtain MRI of A/P with and without contrast liver protocol to evaluate the enhancing focus in the liver better.   Will have him follow-up in 2 weeks to discuss MRI results.     If MRI is good, will plan for continued observation with CT scans every 6 months x 5 years. Will complete in 1/2025.   All questions answered at this time.        Alton Ramos, PIERO

## 2023-07-26 ENCOUNTER — HOSPITAL ENCOUNTER (OUTPATIENT)
Dept: RADIOLOGY | Facility: HOSPITAL | Age: 53
Discharge: HOME | End: 2023-07-26
Attending: INTERNAL MEDICINE
Payer: COMMERCIAL

## 2023-07-26 DIAGNOSIS — C20 RECTAL CANCER: ICD-10-CM

## 2023-07-26 LAB
CREAT SERPL-MCNC: 1.7 MG/DL (ref 0.5–1.4)
SAMPLE: ABNORMAL

## 2023-07-26 PROCEDURE — 74177 CT ABD & PELVIS W/CONTRAST: CPT | Mod: TC

## 2023-07-26 PROCEDURE — 71260 CT THORAX DX C+: CPT | Mod: TC

## 2023-07-26 PROCEDURE — 25500020 PHARM REV CODE 255: Performed by: INTERNAL MEDICINE

## 2023-07-26 RX ADMIN — IOPAMIDOL 100 ML: 755 INJECTION, SOLUTION INTRAVENOUS at 09:07

## 2023-07-26 RX ADMIN — DIATRIZOATE MEGLUMINE AND DIATRIZOATE SODIUM 30 ML: 660; 100 LIQUID ORAL; RECTAL at 08:07

## 2023-07-28 ENCOUNTER — OFFICE VISIT (OUTPATIENT)
Dept: HEMATOLOGY/ONCOLOGY | Facility: CLINIC | Age: 53
End: 2023-07-28
Payer: COMMERCIAL

## 2023-07-28 VITALS
BODY MASS INDEX: 31.86 KG/M2 | DIASTOLIC BLOOD PRESSURE: 79 MMHG | HEART RATE: 84 BPM | SYSTOLIC BLOOD PRESSURE: 133 MMHG | WEIGHT: 215.13 LBS | RESPIRATION RATE: 14 BRPM | HEIGHT: 69 IN | TEMPERATURE: 98 F | OXYGEN SATURATION: 96 %

## 2023-07-28 DIAGNOSIS — R97.8 ELEVATED TUMOR MARKERS: ICD-10-CM

## 2023-07-28 DIAGNOSIS — C20 MALIGNANT NEOPLASM OF RECTUM: Primary | ICD-10-CM

## 2023-07-28 PROCEDURE — 3075F PR MOST RECENT SYSTOLIC BLOOD PRESS GE 130-139MM HG: ICD-10-PCS | Mod: CPTII,S$GLB,, | Performed by: NURSE PRACTITIONER

## 2023-07-28 PROCEDURE — 1159F PR MEDICATION LIST DOCUMENTED IN MEDICAL RECORD: ICD-10-PCS | Mod: CPTII,S$GLB,, | Performed by: NURSE PRACTITIONER

## 2023-07-28 PROCEDURE — 3008F BODY MASS INDEX DOCD: CPT | Mod: CPTII,S$GLB,, | Performed by: NURSE PRACTITIONER

## 2023-07-28 PROCEDURE — 99999 PR PBB SHADOW E&M-EST. PATIENT-LVL V: CPT | Mod: PBBFAC,,, | Performed by: NURSE PRACTITIONER

## 2023-07-28 PROCEDURE — 99214 PR OFFICE/OUTPT VISIT, EST, LEVL IV, 30-39 MIN: ICD-10-PCS | Mod: S$GLB,,, | Performed by: NURSE PRACTITIONER

## 2023-07-28 PROCEDURE — 3075F SYST BP GE 130 - 139MM HG: CPT | Mod: CPTII,S$GLB,, | Performed by: NURSE PRACTITIONER

## 2023-07-28 PROCEDURE — 3008F PR BODY MASS INDEX (BMI) DOCUMENTED: ICD-10-PCS | Mod: CPTII,S$GLB,, | Performed by: NURSE PRACTITIONER

## 2023-07-28 PROCEDURE — 4010F ACE/ARB THERAPY RXD/TAKEN: CPT | Mod: CPTII,S$GLB,, | Performed by: NURSE PRACTITIONER

## 2023-07-28 PROCEDURE — 1159F MED LIST DOCD IN RCRD: CPT | Mod: CPTII,S$GLB,, | Performed by: NURSE PRACTITIONER

## 2023-07-28 PROCEDURE — 3078F PR MOST RECENT DIASTOLIC BLOOD PRESSURE < 80 MM HG: ICD-10-PCS | Mod: CPTII,S$GLB,, | Performed by: NURSE PRACTITIONER

## 2023-07-28 PROCEDURE — 3078F DIAST BP <80 MM HG: CPT | Mod: CPTII,S$GLB,, | Performed by: NURSE PRACTITIONER

## 2023-07-28 PROCEDURE — 4010F PR ACE/ARB THEARPY RXD/TAKEN: ICD-10-PCS | Mod: CPTII,S$GLB,, | Performed by: NURSE PRACTITIONER

## 2023-07-28 PROCEDURE — 99214 OFFICE O/P EST MOD 30 MIN: CPT | Mod: S$GLB,,, | Performed by: NURSE PRACTITIONER

## 2023-07-28 PROCEDURE — 99999 PR PBB SHADOW E&M-EST. PATIENT-LVL V: ICD-10-PCS | Mod: PBBFAC,,, | Performed by: NURSE PRACTITIONER

## 2023-07-28 PROCEDURE — 1160F PR REVIEW ALL MEDS BY PRESCRIBER/CLIN PHARMACIST DOCUMENTED: ICD-10-PCS | Mod: CPTII,S$GLB,, | Performed by: NURSE PRACTITIONER

## 2023-07-28 PROCEDURE — 1160F RVW MEDS BY RX/DR IN RCRD: CPT | Mod: CPTII,S$GLB,, | Performed by: NURSE PRACTITIONER

## 2023-07-28 RX ORDER — AMOXICILLIN 875 MG/1
875 TABLET, FILM COATED ORAL 2 TIMES DAILY
COMMUNITY
Start: 2023-07-18

## 2023-07-28 RX ORDER — TADALAFIL 20 MG/1
20 TABLET ORAL
COMMUNITY
Start: 2023-05-01

## 2023-08-01 ENCOUNTER — PATIENT MESSAGE (OUTPATIENT)
Dept: HEMATOLOGY/ONCOLOGY | Facility: CLINIC | Age: 53
End: 2023-08-01
Payer: COMMERCIAL

## 2023-08-01 DIAGNOSIS — C20 RECTAL CANCER: Primary | ICD-10-CM

## 2023-08-08 ENCOUNTER — TELEPHONE (OUTPATIENT)
Dept: HEMATOLOGY/ONCOLOGY | Facility: CLINIC | Age: 53
End: 2023-08-08
Payer: COMMERCIAL

## 2023-08-08 DIAGNOSIS — C20 MALIGNANT NEOPLASM OF RECTUM: Primary | ICD-10-CM

## 2023-08-08 NOTE — TELEPHONE ENCOUNTER
MRI is good, just shows hemangioma.  Will have patient RTC in 3 months for follow-up with repeat labs.  If CEA more elevated, may need to consider Pet.  Patient notified of results by Ramon Cruz MD

## 2023-11-03 ENCOUNTER — LAB VISIT (OUTPATIENT)
Dept: LAB | Facility: HOSPITAL | Age: 53
End: 2023-11-03
Attending: FAMILY MEDICINE
Payer: COMMERCIAL

## 2023-11-03 DIAGNOSIS — C20 MALIGNANT NEOPLASM OF RECTUM: ICD-10-CM

## 2023-11-03 LAB
ALBUMIN SERPL-MCNC: 4.3 G/DL (ref 3.5–5)
ALBUMIN/GLOB SERPL: 1.5 RATIO (ref 1.1–2)
ALP SERPL-CCNC: 55 UNIT/L (ref 40–150)
ALT SERPL-CCNC: 40 UNIT/L (ref 0–55)
AST SERPL-CCNC: 26 UNIT/L (ref 5–34)
BASOPHILS # BLD AUTO: 0.04 X10(3)/MCL
BASOPHILS NFR BLD AUTO: 0.5 %
BILIRUB SERPL-MCNC: 0.6 MG/DL
BUN SERPL-MCNC: 13.4 MG/DL (ref 8.4–25.7)
CALCIUM SERPL-MCNC: 10.4 MG/DL (ref 8.4–10.2)
CEA SERPL-MCNC: 2.83 NG/ML (ref 0–3)
CHLORIDE SERPL-SCNC: 102 MMOL/L (ref 98–107)
CO2 SERPL-SCNC: 30 MMOL/L (ref 22–29)
CREAT SERPL-MCNC: 1.31 MG/DL (ref 0.73–1.18)
EOSINOPHIL # BLD AUTO: 0.16 X10(3)/MCL (ref 0–0.9)
EOSINOPHIL NFR BLD AUTO: 1.9 %
ERYTHROCYTE [DISTWIDTH] IN BLOOD BY AUTOMATED COUNT: 12.5 % (ref 11.5–17)
GFR SERPLBLD CREATININE-BSD FMLA CKD-EPI: >60 MLS/MIN/1.73/M2
GLOBULIN SER-MCNC: 2.8 GM/DL (ref 2.4–3.5)
GLUCOSE SERPL-MCNC: 74 MG/DL (ref 74–100)
HCT VFR BLD AUTO: 49.8 % (ref 42–52)
HGB BLD-MCNC: 16.5 G/DL (ref 14–18)
IMM GRANULOCYTES # BLD AUTO: 0.03 X10(3)/MCL (ref 0–0.04)
IMM GRANULOCYTES NFR BLD AUTO: 0.4 %
LYMPHOCYTES # BLD AUTO: 1.75 X10(3)/MCL (ref 0.6–4.6)
LYMPHOCYTES NFR BLD AUTO: 20.5 %
MCH RBC QN AUTO: 32.4 PG (ref 27–31)
MCHC RBC AUTO-ENTMCNC: 33.1 G/DL (ref 33–36)
MCV RBC AUTO: 97.6 FL (ref 80–94)
MONOCYTES # BLD AUTO: 0.98 X10(3)/MCL (ref 0.1–1.3)
MONOCYTES NFR BLD AUTO: 11.5 %
NEUTROPHILS # BLD AUTO: 5.58 X10(3)/MCL (ref 2.1–9.2)
NEUTROPHILS NFR BLD AUTO: 65.2 %
PLATELET # BLD AUTO: 264 X10(3)/MCL (ref 130–400)
PMV BLD AUTO: 8.7 FL (ref 7.4–10.4)
POTASSIUM SERPL-SCNC: 4.4 MMOL/L (ref 3.5–5.1)
PROT SERPL-MCNC: 7.1 GM/DL (ref 6.4–8.3)
RBC # BLD AUTO: 5.1 X10(6)/MCL (ref 4.7–6.1)
SODIUM SERPL-SCNC: 140 MMOL/L (ref 136–145)
WBC # SPEC AUTO: 8.54 X10(3)/MCL (ref 4.5–11.5)

## 2023-11-03 PROCEDURE — 36415 COLL VENOUS BLD VENIPUNCTURE: CPT

## 2023-11-03 PROCEDURE — 85025 COMPLETE CBC W/AUTO DIFF WBC: CPT

## 2023-11-03 PROCEDURE — 80053 COMPREHEN METABOLIC PANEL: CPT

## 2023-11-03 PROCEDURE — 82378 CARCINOEMBRYONIC ANTIGEN: CPT

## 2023-11-07 NOTE — PROGRESS NOTES
Subjective:       Patient ID: José Miguel Nugent is a 53 y.o. male.    Surgeon: Dr. Slick Dangelo  Radiation Oncologist: Dr. Jordan Bey  Surgeon in [place]/colonoscopy: Dr. Zaid Izquierdo     Rectal Cancer Stage IIA (T3N0M0) diagnosed 19  Biopsy/pathology: Colonoscopy done 19--ulcerated mass noted at rectosigmoid junction, mass was traversed and reached all the way to cecum, diverticulosis noted in sigmoid but no other abnormalities, mass began at 15cm biopsies done and rectal polyp noted at 10cm and removed. Pathology from tumor showed moderately differentiated adenocarcinoma, polyp c/w hyperplastic polyp.  Surgery/pathology:  S/p LAR on 1/15/20 by Dr. Dangelo--Minimal residual moderately differentiated adenocarcinoma, 9mm in greatest dimension (near complete response). Invades through the muscularis propria into pericolonic adipose tissue. Margins negative. No lymphatic invasion. 16 pericolonic lymph nodes negative for carcinoma. [id number] present measuring 9 mm.   Imagin. MRI pelvis w/ and w/o contrast 10/11/19--a circumferential malignancy present at rectosigmoid junction and measures approximately 8cm in length, extends to approximately 7cm cranial to anal verge, tumor is full thickness and may invade through the rectal serosa but does not appear to invade adjacent perirectal fat, 3mm nodule present to left perirectal fat is concerning but indeterminate for early regional metastasis, no pelvic lymphadenopathy identified.  2. CT C/A with contrast done 10/11/19--2 mm right lower lobe pulmonary nodule is nonspecific, three subcentimeter hepatic hypodensities are too small to characterize, otherwise no definite evidence of metastatic disease in the chest or abdomen.  3. CT A/P done 20--slightly improved appearance of wall thickening around the rectosigmoid junction, small vague hepatic hypodensities show little change, no new suspicious findings.  4.  CT C/A/P done 20--stable right lower  lobe pulmonary nodule, scarring right middle lobe and lingula, stable subcentimeter hypodensities in the liver too small to characterize unchanged, scattered atherosclerotic vascular calcifications, status post left 8 low anterior resection, small periumbilical hernia no evidence of residual recurrent or metastatic disease.  5. CT C/A/P done 1/22/21--No findings to suggest active or progressive disease in the chest abdomen or pelvis.  6. CT C/A/P done 7/23/21--No new suspicious findings in the chest, abdomen or pelvis.   7. CT C/A/P done 1/24/22--No new or worsening malignant findings identified since 7/23/2021.  8. CT C/A/P done 7/20/22--No new suspicious findings chest, abdomen or pelvis, stable small bilateral hepatic hypodensities.  9. CT C/A/P done 1/26/23--No new or worsening findings.  10. CT C/A/P on 7/26/23--No change from previous exam. Small enhancing focus at the anterior left lobe of the liver without detrimental change.  11. MRI A/P w/ and w/o contrast done 8/7/23--No evidence of metastatic disease identified in the abdomen, mild hepatic steatosis.  4 mm flash filling hemangioma at the anterior capsular surface of hepatic segment II, cholecystectomy, mild descending colon diverticulosis.     CEA:  09/23/19--2.5  02/26/20--1.5  05/20/20--3.2  07/13/20-- 1.82  01/22/21--1.88  07/23/21--1.76  10/21/21--1.82  01/24/22--1.77  07/20/22--<1.73  01/26/23--2.45  07/26/23--3.02  11/03/23--2.83     Procedures:  Mediport placed by Dr. Dangelo 2/21/20. Removed 9/2021.   Colonoscopy 1/23/21--negative, repeat recommended in 1 year.  Colonoscopy 1/14/22--sigmoid diverticulosis, no evidence of recurrence. Next due in 3 years.     Treatment History:  1. Neoadjuvant Xeloda/XRT. 10/28/19 - 12/6/19.   2. Adjuvant XELOX x 6 cycles started on 2/26/20. Xeloda dose reduced to 1500mg PO BID on 3/3/20. Oxaliplatin reduced to 100mg/m2 for Cycle 5 due to neuropathy.   Cycle 6 with Xeloda only completed on 7/23/20.      Treatment  plan:  Observation.     Chief Complaint: Other [facility]    HPI   Patient presents for follow-up of rectal cancer. He denies any complaints. Recent labs all good and CEA now WNL. No problems reported.     Past Medical History:   Diagnosis Date    Colon cancer 2019    History of chemotherapy     History of Nissen fundoplication     History of radiation therapy     Hypertension     Sleep apnea     cpap used      Review of patient's allergies indicates:  No Known Allergies   Current Outpatient Medications on File Prior to Visit   Medication Sig Dispense Refill    FLUoxetine 20 MG tablet       lisinopriL-hydrochlorothiazide (PRINZIDE,ZESTORETIC) 20-12.5 mg per tablet Take 1 tablet by mouth every morning.      multivit-min/ferrous fumarate (MULTI VITAMIN ORAL) Take 1 tablet by mouth once daily.      omega-3 fatty acids/fish oil (FISH OIL-OMEGA-3 FATTY ACIDS) 300-1,000 mg capsule Take 1 capsule by mouth once daily.      pravastatin (PRAVACHOL) 10 MG tablet Take 10 mg by mouth.      testosterone cypionate (DEPOTESTOTERONE CYPIONATE) 200 mg/mL injection INJECT 1 ML AT MD OFFICE EVERY OTHER WEEK      amoxicillin (AMOXIL) 875 MG tablet Take 875 mg by mouth 2 (two) times daily.      co-enzyme Q-10 50 mg capsule Take 50 mg by mouth once daily.      tadalafiL (CIALIS) 20 MG Tab Take 20 mg by mouth.      vitamin D (VITAMIN D3) 1000 units Tab Take 1,000 Units by mouth once daily.       No current facility-administered medications on file prior to visit.      Review of Systems   Constitutional:  Negative for appetite change, fatigue, fever and unexpected weight change.   HENT:  Negative for mouth sores.    Eyes: Negative.  Negative for visual disturbance.   Respiratory:  Negative for cough and shortness of breath.    Cardiovascular:  Negative for chest pain and leg swelling.   Gastrointestinal:  Negative for abdominal distention, abdominal pain, constipation, diarrhea, nausea, vomiting and reflux.   Genitourinary:  Negative for  difficulty urinating, dysuria, frequency and hematuria.   Musculoskeletal:  Negative for arthralgias and back pain.   Integumentary:  Negative for rash.   Neurological:  Negative for weakness and headaches.   Hematological:  Negative for adenopathy.   Psychiatric/Behavioral:  Negative for sleep disturbance. The patient is not nervous/anxious.          Vitals:    11/13/23 0926   BP: (!) 145/88   Pulse: 80   Resp: 14   Temp: 97.7 °F (36.5 °C)       Physical Exam  Constitutional:       General: He is awake.      Appearance: Normal appearance.   HENT:      Head: Normocephalic and atraumatic.      Nose: Nose normal.      Mouth/Throat:      Mouth: Mucous membranes are moist.   Eyes:      General: Vision grossly intact.      Extraocular Movements: Extraocular movements intact.      Conjunctiva/sclera: Conjunctivae normal.   Cardiovascular:      Rate and Rhythm: Normal rate and regular rhythm.      Heart sounds: Normal heart sounds.   Pulmonary:      Effort: Pulmonary effort is normal.      Breath sounds: Normal breath sounds.   Abdominal:      General: Bowel sounds are normal. There is no distension.      Palpations: Abdomen is soft.      Tenderness: There is no abdominal tenderness.      Comments: Scars from surgery healed    Musculoskeletal:      Cervical back: Normal range of motion and neck supple.      Right lower leg: No edema.      Left lower leg: No edema.   Lymphadenopathy:      Cervical: No cervical adenopathy.      Upper Body:      Right upper body: No supraclavicular adenopathy.      Left upper body: No supraclavicular adenopathy.   Skin:     General: Skin is warm.   Neurological:      Mental Status: He is alert and oriented to person, place, and time.      Motor: Motor function is intact.   Psychiatric:         Mood and Affect: Mood normal.         Speech: Speech normal.         Behavior: Behavior is cooperative.         Judgment: Judgment normal.       Lab Visit on 11/03/2023   Component Date Value    Sodium  Level 11/03/2023 140     Potassium Level 11/03/2023 4.4     Chloride 11/03/2023 102     Carbon Dioxide 11/03/2023 30 (H)     Glucose Level 11/03/2023 74     Blood Urea Nitrogen 11/03/2023 13.4     Creatinine 11/03/2023 1.31 (H)     Calcium Level Total 11/03/2023 10.4 (H)     Protein Total 11/03/2023 7.1     Albumin Level 11/03/2023 4.3     Globulin 11/03/2023 2.8     Albumin/Globulin Ratio 11/03/2023 1.5     Bilirubin Total 11/03/2023 0.6     Alkaline Phosphatase 11/03/2023 55     Alanine Aminotransferase 11/03/2023 40     Aspartate Aminotransfera* 11/03/2023 26     eGFR 11/03/2023 >60     Carcinoembryonic Antigen 11/03/2023 2.83     WBC 11/03/2023 8.54     RBC 11/03/2023 5.10     Hgb 11/03/2023 16.5     Hct 11/03/2023 49.8     MCV 11/03/2023 97.6 (H)     MCH 11/03/2023 32.4 (H)     MCHC 11/03/2023 33.1     RDW 11/03/2023 12.5     Platelet 11/03/2023 264     MPV 11/03/2023 8.7     Neut % 11/03/2023 65.2     Lymph % 11/03/2023 20.5     Mono % 11/03/2023 11.5     Eos % 11/03/2023 1.9     Basophil % 11/03/2023 0.5     Lymph # 11/03/2023 1.75     Neut # 11/03/2023 5.58     Mono # 11/03/2023 0.98     Eos # 11/03/2023 0.16     Baso # 11/03/2023 0.04     IG# 11/03/2023 0.03     IG% 11/03/2023 0.4           Assessment:       1. Malignant neoplasm of rectum        Plan:       Patient with rectal cancer diagnosed 9/23/19, appears to be stage IIA (T3N0M0) by staging studies, MRI pelvis and CT C/A.  There is an indeterminate perirectal nodule too small to characterize but no lymphadenopathy, a few small hypodense liver lesions, likely cysts and a 2mm lung nodule but no obvious metastatic disease.  Per NCCN guidelines, recommended treatment with neoadjuvant Xeloda/XRT.  Patient was seen by Dr. Dangelo who recommended the same.     Patient started Xeloda 1650mg PO BID and XRT on 10/28/19. Completed on 12/6/19.   He tolerated very well.   CT A/P repeat done 1/2/20 shows slight improvement of wall thickening around the rectosigmoid  junction, otherwise no new findings.     S/p LAR on 1/15/20 by Dr. Dangelo. Minimal residual moderately differentiated adenocarcinoma, 9mm in greatest dimension (near complete response). Invades through the muscularis propria into pericolonic adipose tissue. Margins negative. No lymphatic invasion. 16 pericolonic lymph nodes negative for carcinoma. [id number] present measuring 9 mm.   Near complete response!   Adjuvant XELOX x 6 cycles recommended.  XELOX cycle 1 started 2/26/20. Patient had a difficult time tolerating with severe HA and nausea.  Xeloda reduced to 1500mg PO BID on 3/3/20 during cycle 1 and Emend added with cycle 2.  He started having more neuropathy that was painful at times so the Oxaliplatin dose was reduced with Cycle 5. His neuropathy continued without any change despite the dose reduction.   Cycle 6 completed on 6/23/20 with Xeloda only (Omitted the Oxaliplatin).   CEA elevated in 7/2023, but CT normal. MRI A/P done 8/2023 with JOSUE.     Patient currently without any clinical signs of disease recurrence.     Labs recent with mildly elevated creatinine which is not new. Liver enzymes normal, he has known fatty liver. CEA back to normal.   Last Colonoscopy done 1/15/22 good. Discussed will not need repeat for 3 years since no polyps. Due 1/2025.    RTC 3 months for follow-up, will be due for scans again, labs, visit and CT C/A/P.    Plan to continue CT scans every 6 months x 5 years. Will complete in 1/2025.   All questions answered at this time.        Brandi Cruz MD

## 2023-11-13 ENCOUNTER — OFFICE VISIT (OUTPATIENT)
Dept: HEMATOLOGY/ONCOLOGY | Facility: CLINIC | Age: 53
End: 2023-11-13
Payer: COMMERCIAL

## 2023-11-13 VITALS
SYSTOLIC BLOOD PRESSURE: 145 MMHG | TEMPERATURE: 98 F | OXYGEN SATURATION: 95 % | HEART RATE: 80 BPM | BODY MASS INDEX: 31.74 KG/M2 | RESPIRATION RATE: 14 BRPM | WEIGHT: 214.88 LBS | DIASTOLIC BLOOD PRESSURE: 88 MMHG

## 2023-11-13 DIAGNOSIS — C20 MALIGNANT NEOPLASM OF RECTUM: Primary | ICD-10-CM

## 2023-11-13 PROCEDURE — 3008F BODY MASS INDEX DOCD: CPT | Mod: CPTII,S$GLB,, | Performed by: INTERNAL MEDICINE

## 2023-11-13 PROCEDURE — 3079F PR MOST RECENT DIASTOLIC BLOOD PRESSURE 80-89 MM HG: ICD-10-PCS | Mod: CPTII,S$GLB,, | Performed by: INTERNAL MEDICINE

## 2023-11-13 PROCEDURE — 99214 OFFICE O/P EST MOD 30 MIN: CPT | Mod: S$GLB,,, | Performed by: INTERNAL MEDICINE

## 2023-11-13 PROCEDURE — 1160F RVW MEDS BY RX/DR IN RCRD: CPT | Mod: CPTII,S$GLB,, | Performed by: INTERNAL MEDICINE

## 2023-11-13 PROCEDURE — 4010F PR ACE/ARB THEARPY RXD/TAKEN: ICD-10-PCS | Mod: CPTII,S$GLB,, | Performed by: INTERNAL MEDICINE

## 2023-11-13 PROCEDURE — 3008F PR BODY MASS INDEX (BMI) DOCUMENTED: ICD-10-PCS | Mod: CPTII,S$GLB,, | Performed by: INTERNAL MEDICINE

## 2023-11-13 PROCEDURE — 4010F ACE/ARB THERAPY RXD/TAKEN: CPT | Mod: CPTII,S$GLB,, | Performed by: INTERNAL MEDICINE

## 2023-11-13 PROCEDURE — 3079F DIAST BP 80-89 MM HG: CPT | Mod: CPTII,S$GLB,, | Performed by: INTERNAL MEDICINE

## 2023-11-13 PROCEDURE — 1160F PR REVIEW ALL MEDS BY PRESCRIBER/CLIN PHARMACIST DOCUMENTED: ICD-10-PCS | Mod: CPTII,S$GLB,, | Performed by: INTERNAL MEDICINE

## 2023-11-13 PROCEDURE — 99999 PR PBB SHADOW E&M-EST. PATIENT-LVL IV: ICD-10-PCS | Mod: PBBFAC,,, | Performed by: INTERNAL MEDICINE

## 2023-11-13 PROCEDURE — 99214 PR OFFICE/OUTPT VISIT, EST, LEVL IV, 30-39 MIN: ICD-10-PCS | Mod: S$GLB,,, | Performed by: INTERNAL MEDICINE

## 2023-11-13 PROCEDURE — 3077F SYST BP >= 140 MM HG: CPT | Mod: CPTII,S$GLB,, | Performed by: INTERNAL MEDICINE

## 2023-11-13 PROCEDURE — 99999 PR PBB SHADOW E&M-EST. PATIENT-LVL IV: CPT | Mod: PBBFAC,,, | Performed by: INTERNAL MEDICINE

## 2023-11-13 PROCEDURE — 1159F PR MEDICATION LIST DOCUMENTED IN MEDICAL RECORD: ICD-10-PCS | Mod: CPTII,S$GLB,, | Performed by: INTERNAL MEDICINE

## 2023-11-13 PROCEDURE — 1159F MED LIST DOCD IN RCRD: CPT | Mod: CPTII,S$GLB,, | Performed by: INTERNAL MEDICINE

## 2023-11-13 PROCEDURE — 3077F PR MOST RECENT SYSTOLIC BLOOD PRESSURE >= 140 MM HG: ICD-10-PCS | Mod: CPTII,S$GLB,, | Performed by: INTERNAL MEDICINE

## 2024-01-30 ENCOUNTER — LAB VISIT (OUTPATIENT)
Dept: LAB | Facility: HOSPITAL | Age: 54
End: 2024-01-30
Attending: INTERNAL MEDICINE
Payer: COMMERCIAL

## 2024-01-30 DIAGNOSIS — C20 MALIGNANT NEOPLASM OF RECTUM: ICD-10-CM

## 2024-01-30 LAB
ALBUMIN SERPL-MCNC: 4.3 G/DL (ref 3.5–5)
ALBUMIN/GLOB SERPL: 1.5 RATIO (ref 1.1–2)
ALP SERPL-CCNC: 56 UNIT/L (ref 40–150)
ALT SERPL-CCNC: 32 UNIT/L (ref 0–55)
AST SERPL-CCNC: 24 UNIT/L (ref 5–34)
BASOPHILS # BLD AUTO: 0.03 X10(3)/MCL
BASOPHILS NFR BLD AUTO: 0.4 %
BILIRUB SERPL-MCNC: 0.6 MG/DL
BUN SERPL-MCNC: 14 MG/DL (ref 8.4–25.7)
CALCIUM SERPL-MCNC: 9.6 MG/DL (ref 8.4–10.2)
CEA SERPL-MCNC: 2.76 NG/ML (ref 0–3)
CHLORIDE SERPL-SCNC: 101 MMOL/L (ref 98–107)
CO2 SERPL-SCNC: 29 MMOL/L (ref 22–29)
CREAT SERPL-MCNC: 1.59 MG/DL (ref 0.73–1.18)
EOSINOPHIL # BLD AUTO: 0.09 X10(3)/MCL (ref 0–0.9)
EOSINOPHIL NFR BLD AUTO: 1.3 %
ERYTHROCYTE [DISTWIDTH] IN BLOOD BY AUTOMATED COUNT: 12.4 % (ref 11.5–17)
GFR SERPLBLD CREATININE-BSD FMLA CKD-EPI: 51 MLS/MIN/1.73/M2
GLOBULIN SER-MCNC: 2.9 GM/DL (ref 2.4–3.5)
GLUCOSE SERPL-MCNC: 90 MG/DL (ref 74–100)
HCT VFR BLD AUTO: 51.5 % (ref 42–52)
HGB BLD-MCNC: 17.3 G/DL (ref 14–18)
IMM GRANULOCYTES # BLD AUTO: 0.02 X10(3)/MCL (ref 0–0.04)
IMM GRANULOCYTES NFR BLD AUTO: 0.3 %
LYMPHOCYTES # BLD AUTO: 1.37 X10(3)/MCL (ref 0.6–4.6)
LYMPHOCYTES NFR BLD AUTO: 19.1 %
MCH RBC QN AUTO: 32 PG (ref 27–31)
MCHC RBC AUTO-ENTMCNC: 33.6 G/DL (ref 33–36)
MCV RBC AUTO: 95.2 FL (ref 80–94)
MONOCYTES # BLD AUTO: 0.63 X10(3)/MCL (ref 0.1–1.3)
MONOCYTES NFR BLD AUTO: 8.8 %
NEUTROPHILS # BLD AUTO: 5.05 X10(3)/MCL (ref 2.1–9.2)
NEUTROPHILS NFR BLD AUTO: 70.1 %
PLATELET # BLD AUTO: 254 X10(3)/MCL (ref 130–400)
PMV BLD AUTO: 9.1 FL (ref 7.4–10.4)
POTASSIUM SERPL-SCNC: 4.9 MMOL/L (ref 3.5–5.1)
PROT SERPL-MCNC: 7.2 GM/DL (ref 6.4–8.3)
RBC # BLD AUTO: 5.41 X10(6)/MCL (ref 4.7–6.1)
SODIUM SERPL-SCNC: 138 MMOL/L (ref 136–145)
WBC # SPEC AUTO: 7.19 X10(3)/MCL (ref 4.5–11.5)

## 2024-01-30 PROCEDURE — 36415 COLL VENOUS BLD VENIPUNCTURE: CPT

## 2024-01-30 PROCEDURE — 82378 CARCINOEMBRYONIC ANTIGEN: CPT

## 2024-01-30 PROCEDURE — 85025 COMPLETE CBC W/AUTO DIFF WBC: CPT

## 2024-01-30 PROCEDURE — 80053 COMPREHEN METABOLIC PANEL: CPT

## 2024-02-01 ENCOUNTER — OFFICE VISIT (OUTPATIENT)
Dept: HEMATOLOGY/ONCOLOGY | Facility: CLINIC | Age: 54
End: 2024-02-01
Payer: COMMERCIAL

## 2024-02-01 VITALS
SYSTOLIC BLOOD PRESSURE: 135 MMHG | HEIGHT: 69 IN | TEMPERATURE: 98 F | RESPIRATION RATE: 14 BRPM | DIASTOLIC BLOOD PRESSURE: 83 MMHG | BODY MASS INDEX: 31.7 KG/M2 | OXYGEN SATURATION: 95 % | WEIGHT: 214 LBS | HEART RATE: 68 BPM

## 2024-02-01 DIAGNOSIS — C20 MALIGNANT NEOPLASM OF RECTUM: Primary | ICD-10-CM

## 2024-02-01 PROCEDURE — 3075F SYST BP GE 130 - 139MM HG: CPT | Mod: CPTII,S$GLB,, | Performed by: INTERNAL MEDICINE

## 2024-02-01 PROCEDURE — 3079F DIAST BP 80-89 MM HG: CPT | Mod: CPTII,S$GLB,, | Performed by: INTERNAL MEDICINE

## 2024-02-01 PROCEDURE — 3008F BODY MASS INDEX DOCD: CPT | Mod: CPTII,S$GLB,, | Performed by: INTERNAL MEDICINE

## 2024-02-01 PROCEDURE — 99214 OFFICE O/P EST MOD 30 MIN: CPT | Mod: S$GLB,,, | Performed by: INTERNAL MEDICINE

## 2024-02-01 PROCEDURE — 1159F MED LIST DOCD IN RCRD: CPT | Mod: CPTII,S$GLB,, | Performed by: INTERNAL MEDICINE

## 2024-02-01 PROCEDURE — 1160F RVW MEDS BY RX/DR IN RCRD: CPT | Mod: CPTII,S$GLB,, | Performed by: INTERNAL MEDICINE

## 2024-02-01 PROCEDURE — 99999 PR PBB SHADOW E&M-EST. PATIENT-LVL V: CPT | Mod: PBBFAC,,, | Performed by: INTERNAL MEDICINE

## 2024-02-01 NOTE — PROGRESS NOTES
Subjective:       Patient ID: José Miguel Nugent is a 53 y.o. male.    Surgeon: Dr. Silck Dangelo  Radiation Oncologist: Dr. Jordan Bey  Surgeon in [place]/colonoscopy: Dr. Zaid Izquierdo     Rectal Cancer Stage IIA (T3N0M0) diagnosed 19  Biopsy/pathology: Colonoscopy done 19--ulcerated mass noted at rectosigmoid junction, mass was traversed and reached all the way to cecum, diverticulosis noted in sigmoid but no other abnormalities, mass began at 15cm biopsies done and rectal polyp noted at 10cm and removed. Pathology from tumor showed moderately differentiated adenocarcinoma, polyp c/w hyperplastic polyp.  Surgery/pathology:  S/p LAR on 1/15/20 by Dr. Dangelo--Minimal residual moderately differentiated adenocarcinoma, 9mm in greatest dimension (near complete response). Invades through the muscularis propria into pericolonic adipose tissue. Margins negative. No lymphatic invasion. 16 pericolonic lymph nodes negative for carcinoma. [id number] present measuring 9 mm.   Imagin. MRI pelvis w/ and w/o contrast 10/11/19--a circumferential malignancy present at rectosigmoid junction and measures approximately 8cm in length, extends to approximately 7cm cranial to anal verge, tumor is full thickness and may invade through the rectal serosa but does not appear to invade adjacent perirectal fat, 3mm nodule present to left perirectal fat is concerning but indeterminate for early regional metastasis, no pelvic lymphadenopathy identified.  2. CT C/A with contrast done 10/11/19--2 mm right lower lobe pulmonary nodule is nonspecific, three subcentimeter hepatic hypodensities are too small to characterize, otherwise no definite evidence of metastatic disease in the chest or abdomen.  3. CT A/P done 20--slightly improved appearance of wall thickening around the rectosigmoid junction, small vague hepatic hypodensities show little change, no new suspicious findings.  4.  CT C/A/P done 20--stable right lower  lobe pulmonary nodule, scarring right middle lobe and lingula, stable subcentimeter hypodensities in the liver too small to characterize unchanged, scattered atherosclerotic vascular calcifications, status post left 8 low anterior resection, small periumbilical hernia no evidence of residual recurrent or metastatic disease.  5. CT C/A/P done 1/22/21--No findings to suggest active or progressive disease in the chest abdomen or pelvis.  6. CT C/A/P done 7/23/21--No new suspicious findings in the chest, abdomen or pelvis.   7. CT C/A/P done 1/24/22--No new or worsening malignant findings identified since 7/23/2021.  8. CT C/A/P done 7/20/22--No new suspicious findings chest, abdomen or pelvis, stable small bilateral hepatic hypodensities.  9. CT C/A/P done 1/26/23--No new or worsening findings.  10. CT C/A/P on 7/26/23--No change from previous exam. Small enhancing focus at the anterior left lobe of the liver without detrimental change.  11. MRI A/P w/ and w/o contrast done 8/7/23--No evidence of metastatic disease identified in the abdomen, mild hepatic steatosis.  4 mm flash filling hemangioma at the anterior capsular surface of hepatic segment II, cholecystectomy, mild descending colon diverticulosis.  12. CT C/A/P done 1/30/24--no suspicious findings, JOSUE.     CEA:  09/23/19--2.5  02/26/20--1.5  05/20/20--3.2  07/13/20-- 1.82  01/22/21--1.88  07/23/21--1.76  10/21/21--1.82  01/24/22--1.77  07/20/22--<1.73  01/26/23--2.45  07/26/23--3.02  11/03/23--2.83     Procedures:  Mediport placed by Dr. Dangelo 2/21/20. Removed 9/2021.   Colonoscopy 1/23/21--negative, repeat recommended in 1 year.  Colonoscopy 1/14/22--sigmoid diverticulosis, no evidence of recurrence. Next due in 3 years.     Treatment History:  1. Neoadjuvant Xeloda/XRT. 10/28/19 - 12/6/19.   2. Adjuvant XELOX x 6 cycles started on 2/26/20. Xeloda dose reduced to 1500mg PO BID on 3/3/20. Oxaliplatin reduced to 100mg/m2 for Cycle 5 due to neuropathy.   Cycle  6 with Xeloda only completed on 7/23/20.      Treatment plan:  Observation.     Chief Complaint: Other Misc (Pt reports no new concerns today.)    HPI   Patient presents for follow-up of rectal cancer. He denies any complaints. Recent labs and scans all good and CEA now WNL. No problems reported. He is on testosterone gel so his H/H is higher. Discussed blood donation and he will start because he is O negative.     Past Medical History:   Diagnosis Date    Colon cancer 2019    History of chemotherapy     History of Nissen fundoplication     History of radiation therapy     Hypertension     Sleep apnea     cpap used      Review of patient's allergies indicates:  No Known Allergies   Current Outpatient Medications on File Prior to Visit   Medication Sig Dispense Refill    FLUoxetine 20 MG tablet       lisinopriL-hydrochlorothiazide (PRINZIDE,ZESTORETIC) 20-12.5 mg per tablet Take 1 tablet by mouth every morning.      multivit-min/ferrous fumarate (MULTI VITAMIN ORAL) Take 1 tablet by mouth once daily.      omega-3 fatty acids/fish oil (FISH OIL-OMEGA-3 FATTY ACIDS) 300-1,000 mg capsule Take 1 capsule by mouth once daily.      pravastatin (PRAVACHOL) 10 MG tablet Take 10 mg by mouth.      testosterone cypionate (DEPOTESTOTERONE CYPIONATE) 200 mg/mL injection INJECT 1 ML AT MD OFFICE EVERY OTHER WEEK      amoxicillin (AMOXIL) 875 MG tablet Take 875 mg by mouth 2 (two) times daily.      co-enzyme Q-10 50 mg capsule Take 50 mg by mouth once daily.      tadalafiL (CIALIS) 20 MG Tab Take 20 mg by mouth.      vitamin D (VITAMIN D3) 1000 units Tab Take 1,000 Units by mouth once daily.       No current facility-administered medications on file prior to visit.      Review of Systems   Constitutional:  Negative for appetite change, fatigue, fever and unexpected weight change.   HENT:  Negative for mouth sores.    Eyes: Negative.  Negative for visual disturbance.   Respiratory:  Negative for cough and shortness of breath.     Cardiovascular:  Negative for chest pain and leg swelling.   Gastrointestinal:  Negative for abdominal distention, abdominal pain, constipation, diarrhea, nausea, vomiting and reflux.   Genitourinary:  Negative for difficulty urinating, dysuria, frequency and hematuria.   Musculoskeletal:  Negative for arthralgias and back pain.   Integumentary:  Negative for rash.   Neurological:  Negative for weakness and headaches.   Hematological:  Negative for adenopathy.   Psychiatric/Behavioral:  Negative for sleep disturbance. The patient is not nervous/anxious.          Vitals:    02/01/24 1010   BP: 135/83   Pulse: 68   Resp: 14   Temp: 97.6 °F (36.4 °C)       Physical Exam  Constitutional:       General: He is awake.      Appearance: Normal appearance.   HENT:      Head: Normocephalic and atraumatic.      Nose: Nose normal.      Mouth/Throat:      Mouth: Mucous membranes are moist.   Eyes:      General: Vision grossly intact.      Extraocular Movements: Extraocular movements intact.      Conjunctiva/sclera: Conjunctivae normal.   Cardiovascular:      Rate and Rhythm: Normal rate and regular rhythm.      Heart sounds: Normal heart sounds.   Pulmonary:      Effort: Pulmonary effort is normal.      Breath sounds: Normal breath sounds.   Abdominal:      General: Bowel sounds are normal. There is no distension.      Palpations: Abdomen is soft.      Tenderness: There is no abdominal tenderness.      Comments: Scars from surgery healed    Musculoskeletal:      Cervical back: Normal range of motion and neck supple.      Right lower leg: No edema.      Left lower leg: No edema.   Lymphadenopathy:      Cervical: No cervical adenopathy.      Upper Body:      Right upper body: No supraclavicular adenopathy.      Left upper body: No supraclavicular adenopathy.   Skin:     General: Skin is warm.   Neurological:      Mental Status: He is alert and oriented to person, place, and time.      Motor: Motor function is intact.    Psychiatric:         Mood and Affect: Mood normal.         Speech: Speech normal.         Behavior: Behavior is cooperative.         Judgment: Judgment normal.       Lab Visit on 01/30/2024   Component Date Value    Sodium Level 01/30/2024 138     Potassium Level 01/30/2024 4.9     Chloride 01/30/2024 101     Carbon Dioxide 01/30/2024 29     Glucose Level 01/30/2024 90     Blood Urea Nitrogen 01/30/2024 14.0     Creatinine 01/30/2024 1.59 (H)     Calcium Level Total 01/30/2024 9.6     Protein Total 01/30/2024 7.2     Albumin Level 01/30/2024 4.3     Globulin 01/30/2024 2.9     Albumin/Globulin Ratio 01/30/2024 1.5     Bilirubin Total 01/30/2024 0.6     Alkaline Phosphatase 01/30/2024 56     Alanine Aminotransferase 01/30/2024 32     Aspartate Aminotransfera* 01/30/2024 24     eGFR 01/30/2024 51     Carcinoembryonic Antigen 01/30/2024 2.76     WBC 01/30/2024 7.19     RBC 01/30/2024 5.41     Hgb 01/30/2024 17.3     Hct 01/30/2024 51.5     MCV 01/30/2024 95.2 (H)     MCH 01/30/2024 32.0 (H)     MCHC 01/30/2024 33.6     RDW 01/30/2024 12.4     Platelet 01/30/2024 254     MPV 01/30/2024 9.1     Neut % 01/30/2024 70.1     Lymph % 01/30/2024 19.1     Mono % 01/30/2024 8.8     Eos % 01/30/2024 1.3     Basophil % 01/30/2024 0.4     Lymph # 01/30/2024 1.37     Neut # 01/30/2024 5.05     Mono # 01/30/2024 0.63     Eos # 01/30/2024 0.09     Baso # 01/30/2024 0.03     IG# 01/30/2024 0.02     IG% 01/30/2024 0.3           Assessment:       No diagnosis found.      Plan:       Patient with rectal cancer diagnosed 9/23/19, appears to be stage IIA (T3N0M0) by staging studies, MRI pelvis and CT C/A.  There is an indeterminate perirectal nodule too small to characterize but no lymphadenopathy, a few small hypodense liver lesions, likely cysts and a 2mm lung nodule but no obvious metastatic disease.  Per NCCN guidelines, recommended treatment with neoadjuvant Xeloda/XRT.  Patient was seen by Dr. Dangelo who recommended the same.      Patient started Xeloda 1650mg PO BID and XRT on 10/28/19. Completed on 12/6/19.   He tolerated very well.   CT A/P repeat done 1/2/20 shows slight improvement of wall thickening around the rectosigmoid junction, otherwise no new findings.     S/p LAR on 1/15/20 by Dr. Dangelo. Minimal residual moderately differentiated adenocarcinoma, 9mm in greatest dimension (near complete response). Invades through the muscularis propria into pericolonic adipose tissue. Margins negative. No lymphatic invasion. 16 pericolonic lymph nodes negative for carcinoma. [id number] present measuring 9 mm.   Near complete response!   Adjuvant XELOX x 6 cycles recommended.  XELOX cycle 1 started 2/26/20. Patient had a difficult time tolerating with severe HA and nausea.  Xeloda reduced to 1500mg PO BID on 3/3/20 during cycle 1 and Emend added with cycle 2.  He started having more neuropathy that was painful at times so the Oxaliplatin dose was reduced with Cycle 5. His neuropathy continued without any change despite the dose reduction.   Cycle 6 completed on 6/23/20 with Xeloda only (Omitted the Oxaliplatin).   CEA elevated in 7/2023, but CT normal. MRI A/P done 8/2023 with JOSUE.     Patient currently without any clinical signs of disease recurrence.     Labs recent with mildly elevated creatinine which is not new. Liver enzymes normal, he has known fatty liver. CEA remains WNL.   Last Colonoscopy done 1/15/22 good. Discussed will not need repeat for 3 years since no polyps. Due 1/2025. He will think about it for next visit. The surgeon that was doing his colonoscopies in [place] retired so he will research and find a GI doctor there to do it.     RTC 6 months for follow-up with repeat labs and repeat CT C/A/P at [facility].    Plan to continue CT scans every 6 months x 5 years. Will complete in 1/2025.   All questions answered at this time.        Brandi Cruz MD

## 2024-07-25 NOTE — PROGRESS NOTES
Subjective:       Patient ID: José Miguel Nugent is a 54 y.o. male.    Surgeon: Dr. Slick Dangelo  Radiation Oncologist: Dr. Jordan Bey  Surgeon in [place]/colonoscopy: Dr. Zaid Izquierdo     Rectal Cancer Stage IIA (T3N0M0) diagnosed 19  Biopsy/pathology: Colonoscopy done 19--ulcerated mass noted at rectosigmoid junction, mass was traversed and reached all the way to cecum, diverticulosis noted in sigmoid but no other abnormalities, mass began at 15cm biopsies done and rectal polyp noted at 10cm and removed. Pathology from tumor showed moderately differentiated adenocarcinoma, polyp c/w hyperplastic polyp.  Surgery/pathology:  S/p LAR on 1/15/20 by Dr. Dangelo--Minimal residual moderately differentiated adenocarcinoma, 9mm in greatest dimension (near complete response). Invades through the muscularis propria into pericolonic adipose tissue. Margins negative. No lymphatic invasion. 16 pericolonic lymph nodes negative for carcinoma. [id number] present measuring 9 mm.   Imagin. MRI pelvis w/ and w/o contrast 10/11/19--a circumferential malignancy present at rectosigmoid junction and measures approximately 8cm in length, extends to approximately 7cm cranial to anal verge, tumor is full thickness and may invade through the rectal serosa but does not appear to invade adjacent perirectal fat, 3mm nodule present to left perirectal fat is concerning but indeterminate for early regional metastasis, no pelvic lymphadenopathy identified.  2. CT C/A with contrast done 10/11/19--2 mm right lower lobe pulmonary nodule is nonspecific, three subcentimeter hepatic hypodensities are too small to characterize, otherwise no definite evidence of metastatic disease in the chest or abdomen.  3. CT A/P done 20--slightly improved appearance of wall thickening around the rectosigmoid junction, small vague hepatic hypodensities show little change, no new suspicious findings.  4.  CT C/A/P done 20--stable right lower  lobe pulmonary nodule, scarring right middle lobe and lingula, stable subcentimeter hypodensities in the liver too small to characterize unchanged, scattered atherosclerotic vascular calcifications, status post left 8 low anterior resection, small periumbilical hernia no evidence of residual recurrent or metastatic disease.  5. CT C/A/P done 1/22/21--No findings to suggest active or progressive disease in the chest abdomen or pelvis.  6. CT C/A/P done 7/23/21--No new suspicious findings in the chest, abdomen or pelvis.   7. CT C/A/P done 1/24/22--No new or worsening malignant findings identified since 7/23/2021.  8. CT C/A/P done 7/20/22--No new suspicious findings chest, abdomen or pelvis, stable small bilateral hepatic hypodensities.  9. CT C/A/P done 1/26/23--No new or worsening findings.  10. CT C/A/P on 7/26/23--No change from previous exam. Small enhancing focus at the anterior left lobe of the liver without detrimental change.  11. MRI A/P w/ and w/o contrast done 8/7/23--No evidence of metastatic disease identified in the abdomen, mild hepatic steatosis.  4 mm flash filling hemangioma at the anterior capsular surface of hepatic segment II, cholecystectomy, mild descending colon diverticulosis.  12. CT C/A/P done 1/30/24--no suspicious findings, JOSUE.  13. CT C/A/P done 7/29/24--Moderate hepatic steatosis, moderate sigmoid diverticulosis, anastomotic suture line at the rectosigmoid junction, L4-L5 facet arthropathy, no evidence of any metastatic or recurrent disease.     CEA:  09/23/19--2.5  02/26/20--1.5  05/20/20--3.2  07/13/20-- 1.82  01/22/21--1.88  07/23/21--1.76  10/21/21--1.82  01/24/22--1.77  07/20/22--<1.73  01/26/23--2.45  07/26/23--3.02  11/03/23--2.83     Procedures:  Mediport placed by Dr. Dangelo 2/21/20. Removed 9/2021.   Colonoscopy 1/23/21--negative, repeat recommended in 1 year.  Colonoscopy 1/14/22--sigmoid diverticulosis, no evidence of recurrence. Next due in 3 years.     Treatment  History:  1. Neoadjuvant Xeloda/XRT. 10/28/19 - 12/6/19.   2. Adjuvant XELOX x 6 cycles started on 2/26/20. Xeloda dose reduced to 1500mg PO BID on 3/3/20. Oxaliplatin reduced to 100mg/m2 for Cycle 5 due to neuropathy.   Cycle 6 with Xeloda only completed on 7/23/20.      Treatment plan:  Observation.     Chief Complaint: Other Misc (Pt reports no concerns today.)    HPI   Patient presents for follow-up of rectal cancer. He denies any complaints. Recent labs and scans all good and CEA now WNL. He did donate blood and his HCT is slightly better.     Past Medical History:   Diagnosis Date    Colon cancer 2019    History of chemotherapy     History of Nissen fundoplication     History of radiation therapy     Hypertension     Malignant neoplasm of rectum 07/22/2022    Sleep apnea     cpap used      Review of patient's allergies indicates:  No Known Allergies   Current Outpatient Medications on File Prior to Visit   Medication Sig Dispense Refill    FLUoxetine 20 MG tablet       lisinopriL-hydrochlorothiazide (PRINZIDE,ZESTORETIC) 20-12.5 mg per tablet Take 1 tablet by mouth every morning.      multivit-min/ferrous fumarate (MULTI VITAMIN ORAL) Take 1 tablet by mouth once daily.      omega-3 fatty acids/fish oil (FISH OIL-OMEGA-3 FATTY ACIDS) 300-1,000 mg capsule Take 1 capsule by mouth once daily.      pravastatin (PRAVACHOL) 10 MG tablet Take 10 mg by mouth.      testosterone 10 mg/0.5 gram /actuation GlPm 8 PUMPS TRANSDERMAL ONCE DAILY 90 DAYS      vitamin D (VITAMIN D3) 1000 units Tab Take 1,000 Units by mouth once daily.      [DISCONTINUED] testosterone cypionate (DEPOTESTOTERONE CYPIONATE) 200 mg/mL injection INJECT 1 ML AT MD OFFICE EVERY OTHER WEEK      amoxicillin (AMOXIL) 875 MG tablet Take 875 mg by mouth 2 (two) times daily. (Patient not taking: Reported on 8/1/2024)      co-enzyme Q-10 50 mg capsule Take 50 mg by mouth once daily. (Patient not taking: Reported on 8/1/2024)      tadalafiL (CIALIS) 20 MG Tab  Take 20 mg by mouth.       No current facility-administered medications on file prior to visit.      Review of Systems   Constitutional:  Negative for appetite change, fatigue, fever and unexpected weight change.   HENT:  Negative for mouth sores.    Eyes: Negative.  Negative for visual disturbance.   Respiratory:  Negative for cough and shortness of breath.    Cardiovascular:  Negative for chest pain and leg swelling.   Gastrointestinal:  Negative for abdominal distention, abdominal pain, constipation, diarrhea, nausea, vomiting and reflux.   Genitourinary:  Negative for difficulty urinating, dysuria, frequency and hematuria.   Musculoskeletal:  Negative for arthralgias and back pain.   Integumentary:  Negative for rash.   Neurological:  Negative for weakness and headaches.   Hematological:  Negative for adenopathy.   Psychiatric/Behavioral:  Negative for sleep disturbance. The patient is not nervous/anxious.          Vitals:    08/01/24 0911   BP: 131/86   Pulse: 79   Resp: 14   Temp: 97.7 °F (36.5 °C)     Physical Exam  Constitutional:       General: He is awake.      Appearance: Normal appearance.   HENT:      Head: Normocephalic and atraumatic.      Nose: Nose normal.      Mouth/Throat:      Mouth: Mucous membranes are moist.   Eyes:      General: Vision grossly intact.      Extraocular Movements: Extraocular movements intact.      Conjunctiva/sclera: Conjunctivae normal.   Cardiovascular:      Rate and Rhythm: Normal rate and regular rhythm.      Heart sounds: Normal heart sounds.   Pulmonary:      Effort: Pulmonary effort is normal.      Breath sounds: Normal breath sounds.   Abdominal:      General: Bowel sounds are normal. There is no distension.      Palpations: Abdomen is soft.      Tenderness: There is no abdominal tenderness.      Comments: Scars from surgery healed    Musculoskeletal:      Cervical back: Normal range of motion and neck supple.      Right lower leg: No edema.      Left lower leg: No  edema.   Lymphadenopathy:      Cervical: No cervical adenopathy.      Upper Body:      Right upper body: No supraclavicular adenopathy.      Left upper body: No supraclavicular adenopathy.   Skin:     General: Skin is warm.   Neurological:      Mental Status: He is alert and oriented to person, place, and time.      Motor: Motor function is intact.   Psychiatric:         Mood and Affect: Mood normal.         Speech: Speech normal.         Behavior: Behavior is cooperative.         Judgment: Judgment normal.       Lab Visit on 08/01/2024   Component Date Value    WBC 08/01/2024 6.62     RBC 08/01/2024 5.24     Hgb 08/01/2024 17.1     Hct 08/01/2024 50.1     MCV 08/01/2024 95.6 (H)     MCH 08/01/2024 32.6 (H)     MCHC 08/01/2024 34.1     RDW 08/01/2024 12.5     Platelet 08/01/2024 273     MPV 08/01/2024 8.9     Neut % 08/01/2024 72.6     Lymph % 08/01/2024 17.5     Mono % 08/01/2024 8.3     Eos % 08/01/2024 0.9     Basophil % 08/01/2024 0.5     Lymph # 08/01/2024 1.16     Neut # 08/01/2024 4.81     Mono # 08/01/2024 0.55     Eos # 08/01/2024 0.06     Baso # 08/01/2024 0.03     IG# 08/01/2024 0.01     IG% 08/01/2024 0.2           Assessment:       1. History of rectal cancer      Plan:       Patient with rectal cancer diagnosed 9/23/19, appears to be stage IIA (T3N0M0) by staging studies, MRI pelvis and CT C/A.  There is an indeterminate perirectal nodule too small to characterize but no lymphadenopathy, a few small hypodense liver lesions, likely cysts and a 2mm lung nodule but no obvious metastatic disease.  Per NCCN guidelines, recommended treatment with neoadjuvant Xeloda/XRT.  Patient was seen by Dr. Dangelo who recommended the same.     Patient started Xeloda 1650mg PO BID and XRT on 10/28/19. Completed on 12/6/19.   He tolerated very well.   CT A/P repeat done 1/2/20 shows slight improvement of wall thickening around the rectosigmoid junction, otherwise no new findings.     S/p LAR on 1/15/20 by Dr. Dangelo.  Minimal residual moderately differentiated adenocarcinoma, 9mm in greatest dimension (near complete response). Invades through the muscularis propria into pericolonic adipose tissue. Margins negative. No lymphatic invasion. 16 pericolonic lymph nodes negative for carcinoma. [id number] present measuring 9 mm.   Near complete response!   Adjuvant XELOX x 6 cycles recommended.  XELOX cycle 1 started 2/26/20. Patient had a difficult time tolerating with severe HA and nausea.  Xeloda reduced to 1500mg PO BID on 3/3/20 during cycle 1 and Emend added with cycle 2.  He started having more neuropathy that was painful at times so the Oxaliplatin dose was reduced with Cycle 5. His neuropathy continued without any change despite the dose reduction.   Cycle 6 completed on 6/23/20 with Xeloda only (Omitted the Oxaliplatin).   CEA elevated in 7/2023, but CT normal. MRI A/P done 8/2023 with JOSUE.   CT C/A/P 7/29/24 with JOSUE.     Patient currently without any clinical signs of disease recurrence.     Labs today with H/H mildly elevated, CMP and CEA pending and will follow-up.  Patient to continue blood donation due to his testosterone replacement.     Last Colonoscopy done 1/15/22 good. Due in 1/2025. He will contact another surgeon/GI in [place] to get this done, since his surgeon retired.    RTC 6 months for follow-up with repeat labs and repeat CT C/A/P at [facility].  This will be final scan if good and can change to once annual visits.     All questions answered at this time.        Brandi Cruz MD

## 2024-08-01 ENCOUNTER — OFFICE VISIT (OUTPATIENT)
Dept: HEMATOLOGY/ONCOLOGY | Facility: CLINIC | Age: 54
End: 2024-08-01
Payer: COMMERCIAL

## 2024-08-01 ENCOUNTER — LAB VISIT (OUTPATIENT)
Dept: LAB | Facility: HOSPITAL | Age: 54
End: 2024-08-01
Attending: INTERNAL MEDICINE
Payer: COMMERCIAL

## 2024-08-01 VITALS
BODY MASS INDEX: 30.85 KG/M2 | OXYGEN SATURATION: 96 % | HEIGHT: 69 IN | WEIGHT: 208.31 LBS | DIASTOLIC BLOOD PRESSURE: 86 MMHG | RESPIRATION RATE: 14 BRPM | TEMPERATURE: 98 F | HEART RATE: 79 BPM | SYSTOLIC BLOOD PRESSURE: 131 MMHG

## 2024-08-01 DIAGNOSIS — Z85.048 HISTORY OF RECTAL CANCER: Primary | ICD-10-CM

## 2024-08-01 DIAGNOSIS — C20 MALIGNANT NEOPLASM OF RECTUM: ICD-10-CM

## 2024-08-01 PROBLEM — Z85.038 PERSONAL HISTORY OF COLON CANCER: Status: RESOLVED | Noted: 2021-01-25 | Resolved: 2024-08-01

## 2024-08-01 LAB
ALBUMIN SERPL-MCNC: 4.4 G/DL (ref 3.5–5)
ALBUMIN/GLOB SERPL: 1.6 RATIO (ref 1.1–2)
ALP SERPL-CCNC: 56 UNIT/L (ref 40–150)
ALT SERPL-CCNC: 35 UNIT/L (ref 0–55)
ANION GAP SERPL CALC-SCNC: 10 MEQ/L
AST SERPL-CCNC: 23 UNIT/L (ref 5–34)
BASOPHILS # BLD AUTO: 0.03 X10(3)/MCL
BASOPHILS NFR BLD AUTO: 0.5 %
BILIRUB SERPL-MCNC: 0.9 MG/DL
BUN SERPL-MCNC: 18.6 MG/DL (ref 8.4–25.7)
CALCIUM SERPL-MCNC: 10.2 MG/DL (ref 8.4–10.2)
CEA SERPL-MCNC: 2.84 NG/ML (ref 0–3)
CHLORIDE SERPL-SCNC: 103 MMOL/L (ref 98–107)
CO2 SERPL-SCNC: 27 MMOL/L (ref 22–29)
CREAT SERPL-MCNC: 1.71 MG/DL (ref 0.73–1.18)
CREAT/UREA NIT SERPL: 11
EOSINOPHIL # BLD AUTO: 0.06 X10(3)/MCL (ref 0–0.9)
EOSINOPHIL NFR BLD AUTO: 0.9 %
ERYTHROCYTE [DISTWIDTH] IN BLOOD BY AUTOMATED COUNT: 12.5 % (ref 11.5–17)
GFR SERPLBLD CREATININE-BSD FMLA CKD-EPI: 47 ML/MIN/1.73/M2
GLOBULIN SER-MCNC: 2.7 GM/DL (ref 2.4–3.5)
GLUCOSE SERPL-MCNC: 107 MG/DL (ref 74–100)
HCT VFR BLD AUTO: 50.1 % (ref 42–52)
HGB BLD-MCNC: 17.1 G/DL (ref 14–18)
IMM GRANULOCYTES # BLD AUTO: 0.01 X10(3)/MCL (ref 0–0.04)
IMM GRANULOCYTES NFR BLD AUTO: 0.2 %
LYMPHOCYTES # BLD AUTO: 1.16 X10(3)/MCL (ref 0.6–4.6)
LYMPHOCYTES NFR BLD AUTO: 17.5 %
MCH RBC QN AUTO: 32.6 PG (ref 27–31)
MCHC RBC AUTO-ENTMCNC: 34.1 G/DL (ref 33–36)
MCV RBC AUTO: 95.6 FL (ref 80–94)
MONOCYTES # BLD AUTO: 0.55 X10(3)/MCL (ref 0.1–1.3)
MONOCYTES NFR BLD AUTO: 8.3 %
NEUTROPHILS # BLD AUTO: 4.81 X10(3)/MCL (ref 2.1–9.2)
NEUTROPHILS NFR BLD AUTO: 72.6 %
PLATELET # BLD AUTO: 273 X10(3)/MCL (ref 130–400)
PMV BLD AUTO: 8.9 FL (ref 7.4–10.4)
POTASSIUM SERPL-SCNC: 4.6 MMOL/L (ref 3.5–5.1)
PROT SERPL-MCNC: 7.1 GM/DL (ref 6.4–8.3)
RBC # BLD AUTO: 5.24 X10(6)/MCL (ref 4.7–6.1)
SODIUM SERPL-SCNC: 140 MMOL/L (ref 136–145)
WBC # BLD AUTO: 6.62 X10(3)/MCL (ref 4.5–11.5)

## 2024-08-01 PROCEDURE — 99214 OFFICE O/P EST MOD 30 MIN: CPT | Mod: S$GLB,,, | Performed by: INTERNAL MEDICINE

## 2024-08-01 PROCEDURE — 80053 COMPREHEN METABOLIC PANEL: CPT

## 2024-08-01 PROCEDURE — 36415 COLL VENOUS BLD VENIPUNCTURE: CPT

## 2024-08-01 PROCEDURE — 82378 CARCINOEMBRYONIC ANTIGEN: CPT

## 2024-08-01 PROCEDURE — 85025 COMPLETE CBC W/AUTO DIFF WBC: CPT

## 2024-08-01 PROCEDURE — 3075F SYST BP GE 130 - 139MM HG: CPT | Mod: CPTII,S$GLB,, | Performed by: INTERNAL MEDICINE

## 2024-08-01 PROCEDURE — 3079F DIAST BP 80-89 MM HG: CPT | Mod: CPTII,S$GLB,, | Performed by: INTERNAL MEDICINE

## 2024-08-01 PROCEDURE — 99999 PR PBB SHADOW E&M-EST. PATIENT-LVL V: CPT | Mod: PBBFAC,,, | Performed by: INTERNAL MEDICINE

## 2024-08-01 PROCEDURE — 4010F ACE/ARB THERAPY RXD/TAKEN: CPT | Mod: CPTII,S$GLB,, | Performed by: INTERNAL MEDICINE

## 2024-08-01 PROCEDURE — 1160F RVW MEDS BY RX/DR IN RCRD: CPT | Mod: CPTII,S$GLB,, | Performed by: INTERNAL MEDICINE

## 2024-08-01 PROCEDURE — 3008F BODY MASS INDEX DOCD: CPT | Mod: CPTII,S$GLB,, | Performed by: INTERNAL MEDICINE

## 2024-08-01 PROCEDURE — 1159F MED LIST DOCD IN RCRD: CPT | Mod: CPTII,S$GLB,, | Performed by: INTERNAL MEDICINE

## 2024-08-01 RX ORDER — TESTOSTERONE 10 MG/.5G
GEL, METERED TOPICAL
COMMUNITY
Start: 2024-05-20

## 2024-11-22 ENCOUNTER — OFFICE VISIT (OUTPATIENT)
Dept: SURGERY | Facility: CLINIC | Age: 54
End: 2024-11-22
Payer: COMMERCIAL

## 2024-11-22 VITALS
WEIGHT: 217.94 LBS | SYSTOLIC BLOOD PRESSURE: 118 MMHG | OXYGEN SATURATION: 97 % | HEART RATE: 89 BPM | RESPIRATION RATE: 18 BRPM | HEIGHT: 69 IN | DIASTOLIC BLOOD PRESSURE: 77 MMHG | BODY MASS INDEX: 32.28 KG/M2

## 2024-11-22 DIAGNOSIS — Z00.00 ENCOUNTER FOR SCREENING AND PREVENTATIVE CARE: ICD-10-CM

## 2024-11-22 DIAGNOSIS — Z85.038 PERSONAL HISTORY OF COLON CANCER, STAGE II: ICD-10-CM

## 2024-11-22 DIAGNOSIS — Z12.12 SCREENING FOR COLORECTAL CANCER: Primary | ICD-10-CM

## 2024-11-22 DIAGNOSIS — Z01.818 PREOPERATIVE CLEARANCE: ICD-10-CM

## 2024-11-22 DIAGNOSIS — Z12.11 SCREENING FOR COLORECTAL CANCER: Primary | ICD-10-CM

## 2024-11-22 PROCEDURE — 99999 PR PBB SHADOW E&M-EST. PATIENT-LVL V: CPT | Mod: PBBFAC,,,

## 2024-11-22 RX ORDER — SODIUM CHLORIDE 9 MG/ML
INJECTION, SOLUTION INTRAVENOUS CONTINUOUS
Status: CANCELLED | OUTPATIENT
Start: 2024-11-22

## 2024-11-22 RX ORDER — SODIUM CHLORIDE 9 MG/ML
INJECTION, SOLUTION INTRAVENOUS CONTINUOUS
OUTPATIENT
Start: 2024-11-22

## 2024-11-22 RX ORDER — SODIUM CHLORIDE 0.9 % (FLUSH) 0.9 %
10 SYRINGE (ML) INJECTION
Status: CANCELLED | OUTPATIENT
Start: 2024-11-22

## 2024-11-22 RX ORDER — SODIUM CHLORIDE 0.9 % (FLUSH) 0.9 %
10 SYRINGE (ML) INJECTION
OUTPATIENT
Start: 2024-11-22

## 2024-11-22 RX ORDER — POLYETHYLENE GLYCOL 3350, SODIUM SULFATE ANHYDROUS, SODIUM BICARBONATE, SODIUM CHLORIDE, POTASSIUM CHLORIDE 236; 22.74; 6.74; 5.86; 2.97 G/4L; G/4L; G/4L; G/4L; G/4L
4 POWDER, FOR SOLUTION ORAL ONCE
Qty: 4000 ML | Refills: 0 | Status: SHIPPED | OUTPATIENT
Start: 2024-11-22 | End: 2024-11-22

## 2024-11-22 NOTE — H&P (VIEW-ONLY)
[facility] H&P      Consult: Screening colonoscopy   Consulting Service: No ref. provider found  Chief Complaint: Previous CRC         HPI: Pt is a 54 y.o.male who presents for Screening colonoscopy . Past history of colorectal cancer at the area of the rectosigmoid diagnosed in 2019, status post resection in 2020 with no evidence of recurrence. Last colonoscopy in 2022. Has daily regular bowel movements. Denies melena, rectal bleeding or pain, change in bowel habits, or unintended weight loss. Denies CP, SOB, abdominal pain, nausea, vomiting, fevers, or chills.         PMH:   Past Medical History:   Diagnosis Date    Colon cancer 2019    History of chemotherapy     History of Nissen fundoplication     History of radiation therapy     Hypertension     Malignant neoplasm of rectum 07/22/2022    Sleep apnea     cpap used     PSH:   Past Surgical History:   Procedure Laterality Date    COLONOSCOPY N/A 1/25/2021    Procedure: COLONOSCOPY;  Surgeon: Zaid Izquierdo MD;  Location: [facility] ENDO;  Service: General;  Laterality: N/A;  5  9AM  CL/SB/BD    COLONOSCOPY N/A 1/14/2022    Procedure: COLONOSCOPY;  Surgeon: Zaid Izquierdo MD;  Location: [facility] ENDO;  Service: General;  Laterality: N/A;  0800    GALLBLADDER SURGERY      KNEE SURGERY      left    NISSEN FUNDOPLICATION       Meds: See medication list;  No anticoagulation  ALL: Patient has no known allergies.  FHX: see above   SOC:   Social History     Socioeconomic History    Marital status:    Tobacco Use    Smoking status: Former    Smokeless tobacco: Former   Substance and Sexual Activity    Alcohol use: Yes     Alcohol/week: 3.0 standard drinks of alcohol     Types: 1 Glasses of wine, 1 Cans of beer, 1 Shots of liquor per week     Comment: social     Drug use: Never    Sexual activity: Yes     Partners: Female     ROS: Review of Systems   Constitutional:  Negative for chills, diaphoresis, fever, malaise/fatigue and weight loss.  "  Respiratory:  Negative for cough, hemoptysis and shortness of breath.    Cardiovascular:  Negative for chest pain, palpitations and leg swelling.   Gastrointestinal:  Negative for abdominal pain, blood in stool, constipation, diarrhea, heartburn, melena, nausea and vomiting.   All other systems reviewed and are negative.          Physical Exam:  /77 (BP Location: Left arm, Patient Position: Sitting)   Pulse 89   Resp 18   Ht 5' 9" (1.753 m)   Wt 98.9 kg (217 lb 14.8 oz)   SpO2 97%   BMI 32.18 kg/m²   Physical Exam  Vitals reviewed.   Constitutional:       General: He is not in acute distress.     Appearance: Normal appearance. He is not ill-appearing, toxic-appearing or diaphoretic.   HENT:      Head: Normocephalic and atraumatic.      Mouth/Throat:      Mouth: Mucous membranes are moist.   Eyes:      Conjunctiva/sclera: Conjunctivae normal.   Cardiovascular:      Rate and Rhythm: Normal rate and regular rhythm.   Pulmonary:      Effort: Pulmonary effort is normal. No respiratory distress.   Abdominal:      General: There is no distension.      Palpations: Abdomen is soft.      Tenderness: There is no abdominal tenderness. There is no guarding or rebound.   Musculoskeletal:         General: Normal range of motion.      Cervical back: Normal range of motion.   Skin:     General: Skin is warm.      Capillary Refill: Capillary refill takes less than 2 seconds.   Neurological:      Mental Status: He is alert and oriented to person, place, and time. Mental status is at baseline.   Psychiatric:         Mood and Affect: Mood normal.         Behavior: Behavior normal.         Thought Content: Thought content normal.         Judgment: Judgment normal.               A/P: Pt is a 54 y.o.male who presents for Screening colonoscopy   --To [facility] on 12/18 for colonoscopy  --Procedure explained in detail to patient; including risks of but not limited to bleeding, infection, damage to surrounding structures, and " perforation- patient voiced understanding  --Consent obtained and signed  --Pre-op orders placed  --Bowel prep given - GoLytely   --Instructions reviewed and given to patient   --CLD day before procedure  --Reviewed external records  --Recent CBC and CMP in media        Aguilar Lopez NP  General Surgery   [phone]

## 2024-11-22 NOTE — H&P
[facility] H&P      Consult: Screening colonoscopy   Consulting Service: No ref. provider found  Chief Complaint: Previous CRC         HPI: Pt is a 54 y.o.male who presents for Screening colonoscopy . Past history of colorectal cancer at the area of the rectosigmoid diagnosed in 2019, status post resection in 2020 with no evidence of recurrence. Last colonoscopy in 2022. Has daily regular bowel movements. Denies melena, rectal bleeding or pain, change in bowel habits, or unintended weight loss. Denies CP, SOB, abdominal pain, nausea, vomiting, fevers, or chills.         PMH:   Past Medical History:   Diagnosis Date    Colon cancer 2019    History of chemotherapy     History of Nissen fundoplication     History of radiation therapy     Hypertension     Malignant neoplasm of rectum 07/22/2022    Sleep apnea     cpap used     PSH:   Past Surgical History:   Procedure Laterality Date    COLONOSCOPY N/A 1/25/2021    Procedure: COLONOSCOPY;  Surgeon: Zaid Izquierdo MD;  Location: [facility] ENDO;  Service: General;  Laterality: N/A;  5  9AM  CL/SB/BD    COLONOSCOPY N/A 1/14/2022    Procedure: COLONOSCOPY;  Surgeon: Zaid Izquierdo MD;  Location: [facility] ENDO;  Service: General;  Laterality: N/A;  0800    GALLBLADDER SURGERY      KNEE SURGERY      left    NISSEN FUNDOPLICATION       Meds: See medication list;  No anticoagulation  ALL: Patient has no known allergies.  FHX: see above   SOC:   Social History     Socioeconomic History    Marital status:    Tobacco Use    Smoking status: Former    Smokeless tobacco: Former   Substance and Sexual Activity    Alcohol use: Yes     Alcohol/week: 3.0 standard drinks of alcohol     Types: 1 Glasses of wine, 1 Cans of beer, 1 Shots of liquor per week     Comment: social     Drug use: Never    Sexual activity: Yes     Partners: Female     ROS: Review of Systems   Constitutional:  Negative for chills, diaphoresis, fever, malaise/fatigue and weight loss.  "  Respiratory:  Negative for cough, hemoptysis and shortness of breath.    Cardiovascular:  Negative for chest pain, palpitations and leg swelling.   Gastrointestinal:  Negative for abdominal pain, blood in stool, constipation, diarrhea, heartburn, melena, nausea and vomiting.   All other systems reviewed and are negative.          Physical Exam:  /77 (BP Location: Left arm, Patient Position: Sitting)   Pulse 89   Resp 18   Ht 5' 9" (1.753 m)   Wt 98.9 kg (217 lb 14.8 oz)   SpO2 97%   BMI 32.18 kg/m²   Physical Exam  Vitals reviewed.   Constitutional:       General: He is not in acute distress.     Appearance: Normal appearance. He is not ill-appearing, toxic-appearing or diaphoretic.   HENT:      Head: Normocephalic and atraumatic.      Mouth/Throat:      Mouth: Mucous membranes are moist.   Eyes:      Conjunctiva/sclera: Conjunctivae normal.   Cardiovascular:      Rate and Rhythm: Normal rate and regular rhythm.   Pulmonary:      Effort: Pulmonary effort is normal. No respiratory distress.   Abdominal:      General: There is no distension.      Palpations: Abdomen is soft.      Tenderness: There is no abdominal tenderness. There is no guarding or rebound.   Musculoskeletal:         General: Normal range of motion.      Cervical back: Normal range of motion.   Skin:     General: Skin is warm.      Capillary Refill: Capillary refill takes less than 2 seconds.   Neurological:      Mental Status: He is alert and oriented to person, place, and time. Mental status is at baseline.   Psychiatric:         Mood and Affect: Mood normal.         Behavior: Behavior normal.         Thought Content: Thought content normal.         Judgment: Judgment normal.               A/P: Pt is a 54 y.o.male who presents for Screening colonoscopy   --To [facility] on 12/18 for colonoscopy  --Procedure explained in detail to patient; including risks of but not limited to bleeding, infection, damage to surrounding structures, and " perforation- patient voiced understanding  --Consent obtained and signed  --Pre-op orders placed  --Bowel prep given - GoLytely   --Instructions reviewed and given to patient   --CLD day before procedure  --Reviewed external records  --Recent CBC and CMP in media        Aguilar Lopez NP  General Surgery   [phone]

## 2024-12-16 ENCOUNTER — HOSPITAL ENCOUNTER (OUTPATIENT)
Dept: PREADMISSION TESTING | Facility: HOSPITAL | Age: 54
Discharge: HOME | End: 2024-12-16
Payer: COMMERCIAL

## 2024-12-16 ENCOUNTER — HOSPITAL ENCOUNTER (OUTPATIENT)
Dept: PULMONOLOGY | Facility: HOSPITAL | Age: 54
Discharge: HOME | End: 2024-12-16
Payer: COMMERCIAL

## 2024-12-16 VITALS — BODY MASS INDEX: 31.55 KG/M2 | WEIGHT: 213 LBS | HEIGHT: 69 IN

## 2024-12-16 DIAGNOSIS — Z01.818 PREOPERATIVE CLEARANCE: ICD-10-CM

## 2024-12-16 LAB
OHS QRS DURATION: 84 MS
OHS QTC CALCULATION: 383 MS

## 2024-12-16 PROCEDURE — 93005 ELECTROCARDIOGRAM TRACING: CPT

## 2024-12-16 PROCEDURE — 93010 ELECTROCARDIOGRAM REPORT: CPT | Mod: ,,, | Performed by: STUDENT IN AN ORGANIZED HEALTH CARE EDUCATION/TRAINING PROGRAM

## 2024-12-16 NOTE — DISCHARGE INSTRUCTIONS
BEFORE THE PROCEDURE:    REPORT ANY CHANGE IN YOUR PHYSICAL CONDITION TO YOUR DOCTOR IMMEDIATELY.  SELF ISOLATE AND CHECK TEMPERATURE DAILY, IF TEMP OVER 100, CALL PHYSICIAN IMMEDIATELY.    TRY TO REFRAIN FROM SMOKING AND ALCOHOL 72 HOURS BEFORE YOUR PROCEDURE.     THE DAY BEFORE YOUR PROCEDURE YOU WILL BE ON A  LIQUID DIET FROM WAKING IN THE MORNING UNTIL MIDNIGHT. YOU MAY HAVE WATER ONLY 4 HOURS PRIOR TO ARRIVAL.     REFER TO YOUR PHYSICIANS INSTRUCTIONS ON LIQUID DIET AND COLON PREP.    SOMEONE WILL CALL YOU THE DAY BEFORE YOUR PROCEDURE WITH A CHECK-IN TIME FOR YOUR PROCEDURE.    CHECK IN AT FIRST FLOOR REGISTRATION DESK.     DAY OF YOUR PROCEDURE:    NO MAKE UP, NAIL POLISH OR JEWELRY.  TAKE BLOOD PRESSURE MEDICATIONS THE MORNING OF YOUR PROCEDURE, WITH SMALL SIPS WATER, AS DIRECTED BY YOUR PHYSICIAN.   DO NOT TAKE ANY DIABETIC MEDICATIONS UNLESS DIRECTED TO DO SO BY YOUR PHYSICIAN.     A RESPONSIBLE ADULT MUST ACCOMPANY YOU HOME UPON DISCHARGE.       YOUR THOUGHTS AND OPINIONS HELP US TO BETTER SERVE YOU.     PLEASE PARTICIPATE IN SURVEYS ABOUT YOUR CARE.    THANK YOU FOR CHOOSING [facility].

## 2024-12-17 ENCOUNTER — ANESTHESIA EVENT (OUTPATIENT)
Dept: ENDOSCOPY | Facility: HOSPITAL | Age: 54
End: 2024-12-17
Payer: COMMERCIAL

## 2024-12-17 NOTE — ANESTHESIA PREPROCEDURE EVALUATION
12/17/2024  José Miguel Nugent is a 54 y.o., male.      Pre-op Assessment    I have reviewed the Patient Summary Reports.    I have reviewed the NPO Status.   I have reviewed the Medications.     Review of Systems  Anesthesia Hx:  No problems with previous Anesthesia   History of prior surgery of interest to airway management or planning: nissen fundoplication.         Denies Family Hx of Anesthesia complications.    Denies Personal Hx of Anesthesia complications.                    Social:  Former Smoker       Hematology/Oncology:                        --  Cancer in past history:              chemotherapy, radiation and surgery   Oncology Comments: colon     Cardiovascular:     Hypertension, well controlled              ECG has been reviewed.                            Pulmonary:        Sleep Apnea, CPAP           Education provided regarding risk of obstructive sleep apnea            Renal/:  Renal/ Normal                 Hepatic/GI:  Hepatic/GI Normal                    Neurological:  Neurology Normal                                      Endocrine:  Endocrine Normal              Lab Results   Component Value Date    WBC 6.62 08/01/2024    HGB 17.1 08/01/2024    HCT 50.1 08/01/2024    MCV 95.6 (H) 08/01/2024     08/01/2024      CMP  Sodium   Date Value Ref Range Status   08/01/2024 140 136 - 145 mmol/L Final   01/12/2022 139 136 - 145 mmol/L Final     Potassium   Date Value Ref Range Status   08/01/2024 4.6 3.5 - 5.1 mmol/L Final   01/12/2022 4.1 3.5 - 5.1 mmol/L Final     Chloride   Date Value Ref Range Status   08/01/2024 103 98 - 107 mmol/L Final   01/12/2022 105 95 - 110 mmol/L Final     CO2   Date Value Ref Range Status   08/01/2024 27 22 - 29 mmol/L Final   01/12/2022 29 23 - 29 mmol/L Final     Glucose   Date Value Ref Range Status   01/12/2022 101 70 - 110 mg/dL Final     BUN   Date Value Ref  Range Status   01/12/2022 16 6 - 20 mg/dL Final     Blood Urea Nitrogen   Date Value Ref Range Status   08/01/2024 18.6 8.4 - 25.7 mg/dL Final     Creatinine   Date Value Ref Range Status   08/01/2024 1.71 (H) 0.73 - 1.18 mg/dL Final   01/12/2022 1.2 0.5 - 1.4 mg/dL Final     Calcium   Date Value Ref Range Status   08/01/2024 10.2 8.4 - 10.2 mg/dL Final   01/12/2022 9.9 8.7 - 10.5 mg/dL Final     Total Protein   Date Value Ref Range Status   01/12/2022 7.6 6.0 - 8.4 g/dL Final     Albumin   Date Value Ref Range Status   08/01/2024 4.4 3.5 - 5.0 g/dL Final   01/12/2022 4.2 3.5 - 5.2 g/dL Final     Total Bilirubin   Date Value Ref Range Status   01/12/2022 0.6 0.1 - 1.0 mg/dL Final     Comment:     For infants and newborns, interpretation of results should be based  on gestational age, weight and in agreement with clinical  observations.    Premature Infant recommended reference ranges:  Up to 24 hours.............<8.0 mg/dL  Up to 48 hours............<12.0 mg/dL  3-5 days..................<15.0 mg/dL  6-29 days.................<15.0 mg/dL    For patients on Eltrombopag therapy, use of Dimension [place] TBIL is   not   recommended.       Bilirubin Total   Date Value Ref Range Status   08/01/2024 0.9 <=1.5 mg/dL Final     Alkaline Phosphatase   Date Value Ref Range Status   01/12/2022 71 55 - 135 U/L Final     ALP   Date Value Ref Range Status   08/01/2024 56 40 - 150 unit/L Final     AST   Date Value Ref Range Status   08/01/2024 23 5 - 34 unit/L Final   01/12/2022 26 10 - 40 U/L Final     ALT   Date Value Ref Range Status   08/01/2024 35 0 - 55 unit/L Final   01/12/2022 60 (H) 10 - 44 U/L Final     Anion Gap   Date Value Ref Range Status   01/12/2022 5 (L) 8 - 16 mmol/L Final     eGFR   Date Value Ref Range Status   08/01/2024 47 mL/min/1.73/m2 Final        Physical Exam  General: Well nourished    Airway:  Mallampati: III / II  Mouth Opening: Normal  TM Distance: Normal  Tongue: Normal  Neck ROM: Normal  ROM    Dental:  Intact    Chest/Lungs:  Clear to auscultation    Heart:  Rate: Normal  Rhythm: Regular Rhythm  Sounds: Normal        Anesthesia Plan  Type of Anesthesia, risks & benefits discussed:    Anesthesia Type: MAC  Intra-op Monitoring Plan: Standard ASA Monitors  Post Op Pain Control Plan: multimodal analgesia  Induction:  IV  Airway Plan: Direct  Informed Consent: Informed consent signed with the Patient and all parties understand the risks and agree with anesthesia plan.  All questions answered.   ASA Score: 3  Day of Surgery Review of History & Physical: I have interviewed and examined the patient. I have reviewed the patient's H&P dated: There are no significant changes.     Ready For Surgery From Anesthesia Perspective.     .

## 2024-12-18 ENCOUNTER — HOSPITAL ENCOUNTER (OUTPATIENT)
Facility: HOSPITAL | Age: 54
Discharge: HOME | End: 2024-12-18
Attending: STUDENT IN AN ORGANIZED HEALTH CARE EDUCATION/TRAINING PROGRAM | Admitting: STUDENT IN AN ORGANIZED HEALTH CARE EDUCATION/TRAINING PROGRAM
Payer: COMMERCIAL

## 2024-12-18 ENCOUNTER — ANESTHESIA (OUTPATIENT)
Dept: ENDOSCOPY | Facility: HOSPITAL | Age: 54
End: 2024-12-18
Payer: COMMERCIAL

## 2024-12-18 DIAGNOSIS — Z85.038 PERSONAL HISTORY OF COLON CANCER, STAGE II: ICD-10-CM

## 2024-12-18 DIAGNOSIS — Z12.11 SCREENING FOR COLORECTAL CANCER: ICD-10-CM

## 2024-12-18 DIAGNOSIS — Z00.00 ENCOUNTER FOR SCREENING AND PREVENTATIVE CARE: ICD-10-CM

## 2024-12-18 DIAGNOSIS — Z12.11 SCREEN FOR COLON CANCER: Primary | ICD-10-CM

## 2024-12-18 DIAGNOSIS — Z12.12 SCREENING FOR COLORECTAL CANCER: ICD-10-CM

## 2024-12-18 PROCEDURE — G0105 COLORECTAL SCRN; HI RISK IND: HCPCS | Performed by: STUDENT IN AN ORGANIZED HEALTH CARE EDUCATION/TRAINING PROGRAM

## 2024-12-18 PROCEDURE — 37000009 HC ANESTHESIA EA ADD 15 MINS: Performed by: STUDENT IN AN ORGANIZED HEALTH CARE EDUCATION/TRAINING PROGRAM

## 2024-12-18 PROCEDURE — 37000008 HC ANESTHESIA 1ST 15 MINUTES: Performed by: STUDENT IN AN ORGANIZED HEALTH CARE EDUCATION/TRAINING PROGRAM

## 2024-12-18 PROCEDURE — 63600175 PHARM REV CODE 636 W HCPCS: Performed by: NURSE ANESTHETIST, CERTIFIED REGISTERED

## 2024-12-18 RX ORDER — PROPOFOL 10 MG/ML
INJECTION, EMULSION INTRAVENOUS
Status: DISCONTINUED | OUTPATIENT
Start: 2024-12-18 | End: 2024-12-18

## 2024-12-18 RX ORDER — SODIUM CHLORIDE 0.9 % (FLUSH) 0.9 %
10 SYRINGE (ML) INJECTION
Status: DISCONTINUED | OUTPATIENT
Start: 2024-12-18 | End: 2024-12-18 | Stop reason: HOSPADM

## 2024-12-18 RX ORDER — SODIUM CHLORIDE 9 MG/ML
INJECTION, SOLUTION INTRAVENOUS CONTINUOUS
Status: DISCONTINUED | OUTPATIENT
Start: 2024-12-18 | End: 2024-12-18 | Stop reason: HOSPADM

## 2024-12-18 RX ADMIN — PROPOFOL 50 MG: 10 INJECTION, EMULSION INTRAVENOUS at 07:12

## 2024-12-18 RX ADMIN — PROPOFOL 100 MG: 10 INJECTION, EMULSION INTRAVENOUS at 07:12

## 2024-12-18 NOTE — ANESTHESIA POSTPROCEDURE EVALUATION
Anesthesia Post Evaluation    Patient: José Miguel Nugent    Procedure(s) Performed: Procedure(s) (LRB):  COLONOSCOPY, SCREENING, LOW RISK PATIENT (N/A)    Final Anesthesia Type: MAC      Patient location during evaluation: OPS  Patient participation: Yes- Able to Participate  Level of consciousness: oriented and awake and alert  Post-procedure vital signs: reviewed and stable  Pain management: adequate  Airway patency: patent    PONV status at discharge: No PONV  Anesthetic complications: no      Cardiovascular status: blood pressure returned to baseline  Respiratory status: spontaneous ventilation, unassisted and room air  Hydration status: euvolemic  Follow-up not needed.              Vitals Value Taken Time   /86 12/18/24 0814   Temp 36.5 °C (97.7 °F) 12/18/24 0814   Pulse 64 12/18/24 0814   Resp 18 12/18/24 0814   SpO2 94 % 12/18/24 0814         Event Time   Out of Recovery 08:05:00         Pain/Carlo Score: Carlo Score: 10 (12/18/2024  8:23 AM)

## 2024-12-18 NOTE — TRANSFER OF CARE
Anesthesia Transfer of Care Note    Patient: José Miguel Nugent    Procedure(s) Performed: Procedure(s) (LRB):  COLONOSCOPY, SCREENING, LOW RISK PATIENT (N/A)    Patient location: OPS    Anesthesia Type: MAC    Transport from OR: Transported from OR on room air with adequate spontaneous ventilation    Post pain: adequate analgesia    Post assessment: no apparent anesthetic complications    Post vital signs: stable    Level of consciousness: awake, alert and oriented    Nausea/Vomiting: no nausea/vomiting    Complications: none    Transfer of care protocol was followed      Last vitals:     /65  P 66  R 20  O2 Sat 96%

## 2024-12-18 NOTE — DISCHARGE INSTRUCTIONS
FOLLOW UP WITH DR JOSE AS INSTRUCTED.    NO DRIVING OR DRINKING ALCOHOL FOR 24 HOURS.    CALL DR JOSE'S OFFICE FOR ANY QUESTIONS OR CONCERNS.  REPORT TO THE ER IF URGENT.    THANK YOU FOR CHOOSING [facility]!

## 2024-12-18 NOTE — DISCHARGE SUMMARY
[facility] - Endoscopy  Discharge Note  Short Stay    Procedure(s) (LRB):  COLONOSCOPY, SCREENING, LOW RISK PATIENT (N/A)      OUTCOME: Patient tolerated treatment/procedure well without complication and is now ready for discharge.    DISPOSITION: Home or Self Care    FINAL DIAGNOSIS:  Screen for colon cancer    FOLLOWUP:  as needed    DISCHARGE INSTRUCTIONS:    Discharge Procedure Orders   Diet Adult Regular     No driving until:   Order Comments: 24 hours after procedure     Notify your health care provider if you experience any of the following:  temperature >100.4     Notify your health care provider if you experience any of the following:  persistent nausea and vomiting or diarrhea     Notify your health care provider if you experience any of the following:  severe uncontrolled pain     Activity as tolerated        TIME SPENT ON DISCHARGE: 5 minutes

## 2024-12-18 NOTE — INTERVAL H&P NOTE
Patient seen and examined.  No interval change since the below obtained H&P  Informed consent verified    NPO since midnight  Prep completed - Golytely  No anticoagulation  All questions and concerns addressed  To Endo for screening colonoscopy     Mikaela Lange MD  General Surgery   [phone]

## 2024-12-27 VITALS
OXYGEN SATURATION: 94 % | SYSTOLIC BLOOD PRESSURE: 145 MMHG | HEART RATE: 64 BPM | DIASTOLIC BLOOD PRESSURE: 86 MMHG | TEMPERATURE: 98 F | RESPIRATION RATE: 18 BRPM

## 2025-02-03 PROBLEM — Z85.038 PERSONAL HISTORY OF COLON CANCER, STAGE II: Status: RESOLVED | Noted: 2024-11-22 | Resolved: 2025-02-03

## 2025-02-04 NOTE — PROGRESS NOTES
Subjective:       Patient ID: José Miguel Nugent is a 54 y.o. male.    Surgeon: Dr. Slick Dangelo  Radiation Oncologist: Dr. Jordan Bey  Surgeon in [facility]/colonoscopy: Dr. Zaid Izquierdo     Rectal Cancer Stage IIA (T3N0M0) diagnosed 19  Biopsy/pathology: Colonoscopy done 19--ulcerated mass noted at rectosigmoid junction, mass was traversed and reached all the way to cecum, diverticulosis noted in sigmoid but no other abnormalities, mass began at 15cm biopsies done and rectal polyp noted at 10cm and removed. Pathology from tumor showed moderately differentiated adenocarcinoma, polyp c/w hyperplastic polyp.  Surgery/pathology:  S/p LAR on 1/15/20 by Dr. Dangelo--Minimal residual moderately differentiated adenocarcinoma, 9mm in greatest dimension (near complete response). Invades through the muscularis propria into pericolonic adipose tissue. Margins negative. No lymphatic invasion. 16 pericolonic lymph nodes negative for carcinoma. [id number] present measuring 9 mm.   Imagin. MRI pelvis w/ and w/o contrast 10/11/19--a circumferential malignancy present at rectosigmoid junction and measures approximately 8cm in length, extends to approximately 7cm cranial to anal verge, tumor is full thickness and may invade through the rectal serosa but does not appear to invade adjacent perirectal fat, 3mm nodule present to left perirectal fat is concerning but indeterminate for early regional metastasis, no pelvic lymphadenopathy identified.  2. CT C/A with contrast done 10/11/19--2 mm right lower lobe pulmonary nodule is nonspecific, three subcentimeter hepatic hypodensities are too small to characterize, otherwise no definite evidence of metastatic disease in the chest or abdomen.  3. CT A/P done 20--slightly improved appearance of wall thickening around the rectosigmoid junction, small vague hepatic hypodensities show little change, no new suspicious findings.  4.  CT C/A/P done 20--stable right lower  lobe pulmonary nodule, scarring right middle lobe and lingula, stable subcentimeter hypodensities in the liver too small to characterize unchanged, scattered atherosclerotic vascular calcifications, status post left 8 low anterior resection, small periumbilical hernia no evidence of residual recurrent or metastatic disease.  5. CT C/A/P done 1/22/21--No findings to suggest active or progressive disease in the chest abdomen or pelvis.  6. CT C/A/P done 7/23/21--No new suspicious findings in the chest, abdomen or pelvis.   7. CT C/A/P done 1/24/22--No new or worsening malignant findings identified since 7/23/2021.  8. CT C/A/P done 7/20/22--No new suspicious findings chest, abdomen or pelvis, stable small bilateral hepatic hypodensities.  9. CT C/A/P done 1/26/23--No new or worsening findings.  10. CT C/A/P on 7/26/23--No change from previous exam. Small enhancing focus at the anterior left lobe of the liver without detrimental change.  11. MRI A/P w/ and w/o contrast done 8/7/23--No evidence of metastatic disease identified in the abdomen, mild hepatic steatosis.  4 mm flash filling hemangioma at the anterior capsular surface of hepatic segment II, cholecystectomy, mild descending colon diverticulosis.  12. CT C/A/P done 1/30/24--no suspicious findings, JOSUE.  13. CT C/A/P done 7/29/24--Moderate hepatic steatosis, moderate sigmoid diverticulosis, anastomotic suture line at the rectosigmoid junction, L4-L5 facet arthropathy, no evidence of any metastatic or recurrent disease.  14. CT C/A/P done 2/3/25--No evidence of disease, persistent diffuse hepatic steatosis, post-partial resection of the distal colon with colonic diverticulosis, advanced at the sigmoid colon and moderate at the descending colon.      CEA:  09/23/19--2.5  02/26/20--1.5  05/20/20--3.2  07/13/20-- 1.82  01/22/21--1.88  07/23/21--1.76  10/21/21--1.82  01/24/22--1.77  07/20/22--<1.73  01/26/23--2.45  07/26/23--3.02  11/03/23--2.83  08/21/24--2.84      Procedures:  Mediport placed by Dr. Dangelo 2/21/20. Removed 9/2021.   Colonoscopy 1/23/21--negative, repeat recommended in 1 year.  Colonoscopy 1/14/22--sigmoid diverticulosis, no evidence of recurrence. Next due in 3 years.  Colonoscopy (Dr. Mikaela Lange) 12/18/24--no masses, colorectal anastomosis at 15cm with no signs of recurrence, normal mucosa with normal submucosal venous pattern, no vascular lesions identified, minor diverticular disease. Repeat in 3 years.     Treatment History:  1. Neoadjuvant Xeloda/XRT. 10/28/19 - 12/6/19.   2. Adjuvant XELOX x 6 cycles started on 2/26/20. Xeloda dose reduced to 1500mg PO BID on 3/3/20. Oxaliplatin reduced to 100mg/m2 for Cycle 5 due to neuropathy.   Cycle 6 with Xeloda only completed on 7/23/20.      Treatment plan:  Observation.     Chief Complaint: Other Misc (Pt reports no concerns today.)    HPI   Patient presents for follow-up of rectal cancer. He denies any complaints. He underwent colonoscopy in December that was good. Repeat recommended in 3 years. Recent CT all good with JOSUE. He has no complaints. Overall feels well.     Past Medical History:   Diagnosis Date    Colon cancer 2019    History of chemotherapy     History of Nissen fundoplication     History of radiation therapy     Hypertension     Malignant neoplasm of rectum 07/22/2022    Screen for colon cancer 12/18/2024    Sleep apnea     cpap used      Review of patient's allergies indicates:  No Known Allergies   Current Outpatient Medications on File Prior to Visit   Medication Sig Dispense Refill    cyclobenzaprine (FLEXERIL) 5 MG tablet Take 5-10 mg by mouth nightly as needed.      FLUoxetine 40 MG capsule Take 40 mg by mouth once daily.      lisinopriL-hydrochlorothiazide (PRINZIDE,ZESTORETIC) 20-12.5 mg per tablet Take 1 tablet by mouth every morning.      multivit-min/ferrous fumarate (MULTI VITAMIN ORAL) Take 1 tablet by mouth once daily.      omega-3 fatty acids/fish oil (FISH OIL-OMEGA-3  FATTY ACIDS) 300-1,000 mg capsule Take 1 capsule by mouth once daily.      pravastatin (PRAVACHOL) 10 MG tablet Take 10 mg by mouth.      testosterone 10 mg/0.5 gram /actuation GlPm 8 PUMPS TRANSDERMAL ONCE DAILY 90 DAYS      [DISCONTINUED] FLUoxetine 20 MG tablet       tadalafiL (CIALIS) 20 MG Tab Take 20 mg by mouth.       No current facility-administered medications on file prior to visit.      Review of Systems   Constitutional:  Negative for appetite change, fatigue, fever and unexpected weight change.   HENT:  Negative for mouth sores.    Eyes: Negative.  Negative for visual disturbance.   Respiratory:  Negative for cough and shortness of breath.    Cardiovascular:  Negative for chest pain and leg swelling.   Gastrointestinal:  Negative for abdominal distention, abdominal pain, constipation, diarrhea, nausea, vomiting and reflux.   Genitourinary:  Negative for difficulty urinating, dysuria, frequency and hematuria.   Musculoskeletal:  Negative for arthralgias and back pain.   Integumentary:  Negative for rash.   Neurological:  Negative for weakness and headaches.   Hematological:  Negative for adenopathy.   Psychiatric/Behavioral:  Negative for sleep disturbance. The patient is not nervous/anxious.          Vitals:    02/10/25 0904   BP: 138/88   Pulse: 72   Resp: 14   Temp: 97.7 °F (36.5 °C)     Wt Readings from Last 3 Encounters:   02/10/25 0904 97.5 kg (214 lb 14.4 oz)   12/16/24 0813 96.6 kg (213 lb)   11/22/24 0817 98.9 kg (217 lb 14.8 oz)       Physical Exam  Constitutional:       General: He is awake.      Appearance: Normal appearance.   HENT:      Head: Normocephalic and atraumatic.      Nose: Nose normal.      Mouth/Throat:      Mouth: Mucous membranes are moist.   Eyes:      General: Vision grossly intact.      Extraocular Movements: Extraocular movements intact.      Conjunctiva/sclera: Conjunctivae normal.   Cardiovascular:      Rate and Rhythm: Normal rate and regular rhythm.      Heart sounds:  Normal heart sounds.   Pulmonary:      Effort: Pulmonary effort is normal.      Breath sounds: Normal breath sounds.   Abdominal:      General: Bowel sounds are normal. There is no distension.      Palpations: Abdomen is soft.      Tenderness: There is no abdominal tenderness.      Comments: Scars from surgery healed    Musculoskeletal:      Cervical back: Normal range of motion and neck supple.      Right lower leg: No edema.      Left lower leg: No edema.   Lymphadenopathy:      Cervical: No cervical adenopathy.      Upper Body:      Right upper body: No supraclavicular adenopathy.      Left upper body: No supraclavicular adenopathy.   Skin:     General: Skin is warm.   Neurological:      Mental Status: He is alert and oriented to person, place, and time.      Motor: Motor function is intact.   Psychiatric:         Mood and Affect: Mood normal.         Speech: Speech normal.         Behavior: Behavior is cooperative.         Judgment: Judgment normal.       Lab Visit on 02/10/2025   Component Date Value    WBC 02/10/2025 4.88     RBC 02/10/2025 5.05     Hgb 02/10/2025 16.2     Hct 02/10/2025 48.1     MCV 02/10/2025 95.2 (H)     MCH 02/10/2025 32.1 (H)     MCHC 02/10/2025 33.7     RDW 02/10/2025 12.8     Platelet 02/10/2025 247     MPV 02/10/2025 8.8     Neut % 02/10/2025 61.9     Lymph % 02/10/2025 24.2     Mono % 02/10/2025 11.7     Eos % 02/10/2025 1.4     Basophil % 02/10/2025 0.6     Imm Grans % 02/10/2025 0.2     Neut # 02/10/2025 3.02     Lymph # 02/10/2025 1.18     Mono # 02/10/2025 0.57     Eos # 02/10/2025 0.07     Baso # 02/10/2025 0.03     Imm Gran # 02/10/2025 0.01           Assessment:       1. History of rectal cancer        Plan:       Patient with rectal cancer diagnosed 9/23/19, appears to be stage IIA (T3N0M0) by staging studies, MRI pelvis and CT C/A.  There is an indeterminate perirectal nodule too small to characterize but no lymphadenopathy, a few small hypodense liver lesions, likely cysts  and a 2mm lung nodule but no obvious metastatic disease.  Per NCCN guidelines, recommended treatment with neoadjuvant Xeloda/XRT.  Patient was seen by Dr. Dangelo who recommended the same.     Patient started Xeloda 1650mg PO BID and XRT on 10/28/19. Completed on 12/6/19.   He tolerated very well.   CT A/P repeat done 1/2/20 shows slight improvement of wall thickening around the rectosigmoid junction, otherwise no new findings.     S/p LAR on 1/15/20 by Dr. Dangelo. Minimal residual moderately differentiated adenocarcinoma, 9mm in greatest dimension (near complete response). Invades through the muscularis propria into pericolonic adipose tissue. Margins negative. No lymphatic invasion. 16 pericolonic lymph nodes negative for carcinoma. [id number] present measuring 9 mm.   Near complete response!   Adjuvant XELOX x 6 cycles recommended.  XELOX cycle 1 started 2/26/20. Patient had a difficult time tolerating with severe HA and nausea.  Xeloda reduced to 1500mg PO BID on 3/3/20 during cycle 1 and Emend added with cycle 2.  He started having more neuropathy that was painful at times so the Oxaliplatin dose was reduced with Cycle 5. His neuropathy continued without any change despite the dose reduction.   Cycle 6 completed on 6/23/20 with Xeloda only (Omitted the Oxaliplatin).   CEA elevated in 7/2023, but CT normal. MRI A/P done 8/2023 with JOSUE.     CT C/A/P 2/3/25 with JOSUE.    Patient currently without any clinical signs of disease recurrence.     Labs today with normal CBC, will f/u CMP and CEA.    Patient to continue blood donation due to his testosterone replacement.     Last Colonoscopy done 12/18/24 good, repeat in 3 years.     Patient is now >5 years out from surgery.  Change visits to annual and no further scans unless any new signs/symptoms or lab findings.     F/u in 1 year with labs CBC, CMP, CEA.       All questions answered at this time.        Brandi Cruz MD       Visit today included increased complexity  associated with the care of the episodic problem rectal cancer, addressing and managing the longitudinal care of the patient's rectal cancer.

## 2025-02-10 ENCOUNTER — OFFICE VISIT (OUTPATIENT)
Dept: HEMATOLOGY/ONCOLOGY | Facility: CLINIC | Age: 55
End: 2025-02-10
Payer: COMMERCIAL

## 2025-02-10 ENCOUNTER — LAB VISIT (OUTPATIENT)
Dept: LAB | Facility: HOSPITAL | Age: 55
End: 2025-02-10
Attending: INTERNAL MEDICINE
Payer: COMMERCIAL

## 2025-02-10 VITALS
HEIGHT: 69 IN | TEMPERATURE: 98 F | OXYGEN SATURATION: 97 % | DIASTOLIC BLOOD PRESSURE: 88 MMHG | WEIGHT: 214.88 LBS | HEART RATE: 72 BPM | RESPIRATION RATE: 14 BRPM | SYSTOLIC BLOOD PRESSURE: 138 MMHG | BODY MASS INDEX: 31.83 KG/M2

## 2025-02-10 DIAGNOSIS — Z85.048 HISTORY OF RECTAL CANCER: Primary | ICD-10-CM

## 2025-02-10 DIAGNOSIS — Z85.048 HISTORY OF RECTAL CANCER: ICD-10-CM

## 2025-02-10 LAB
ALBUMIN SERPL-MCNC: 4.2 G/DL (ref 3.5–5)
ALBUMIN/GLOB SERPL: 1.4 RATIO (ref 1.1–2)
ALP SERPL-CCNC: 54 UNIT/L (ref 40–150)
ALT SERPL-CCNC: 33 UNIT/L (ref 0–55)
ANION GAP SERPL CALC-SCNC: 7 MEQ/L
AST SERPL-CCNC: 27 UNIT/L (ref 5–34)
BASOPHILS # BLD AUTO: 0.03 X10(3)/MCL
BASOPHILS NFR BLD AUTO: 0.6 %
BILIRUB SERPL-MCNC: 0.8 MG/DL
BUN SERPL-MCNC: 18.2 MG/DL (ref 8.4–25.7)
CALCIUM SERPL-MCNC: 9.9 MG/DL (ref 8.4–10.2)
CEA SERPL-MCNC: 2.99 NG/ML (ref 0–3)
CHLORIDE SERPL-SCNC: 104 MMOL/L (ref 98–107)
CO2 SERPL-SCNC: 28 MMOL/L (ref 22–29)
CREAT SERPL-MCNC: 1.45 MG/DL (ref 0.72–1.25)
CREAT/UREA NIT SERPL: 13
EOSINOPHIL # BLD AUTO: 0.07 X10(3)/MCL (ref 0–0.9)
EOSINOPHIL NFR BLD AUTO: 1.4 %
ERYTHROCYTE [DISTWIDTH] IN BLOOD BY AUTOMATED COUNT: 12.8 % (ref 11.5–17)
GFR SERPLBLD CREATININE-BSD FMLA CKD-EPI: 57 ML/MIN/1.73/M2
GLOBULIN SER-MCNC: 2.9 GM/DL (ref 2.4–3.5)
GLUCOSE SERPL-MCNC: 100 MG/DL (ref 74–100)
HCT VFR BLD AUTO: 48.1 % (ref 42–52)
HGB BLD-MCNC: 16.2 G/DL (ref 14–18)
IMM GRANULOCYTES # BLD AUTO: 0.01 X10(3)/MCL (ref 0–0.04)
IMM GRANULOCYTES NFR BLD AUTO: 0.2 %
LYMPHOCYTES # BLD AUTO: 1.18 X10(3)/MCL (ref 0.6–4.6)
LYMPHOCYTES NFR BLD AUTO: 24.2 %
MCH RBC QN AUTO: 32.1 PG (ref 27–31)
MCHC RBC AUTO-ENTMCNC: 33.7 G/DL (ref 33–36)
MCV RBC AUTO: 95.2 FL (ref 80–94)
MONOCYTES # BLD AUTO: 0.57 X10(3)/MCL (ref 0.1–1.3)
MONOCYTES NFR BLD AUTO: 11.7 %
NEUTROPHILS # BLD AUTO: 3.02 X10(3)/MCL (ref 2.1–9.2)
NEUTROPHILS NFR BLD AUTO: 61.9 %
PLATELET # BLD AUTO: 247 X10(3)/MCL (ref 130–400)
PMV BLD AUTO: 8.8 FL (ref 7.4–10.4)
POTASSIUM SERPL-SCNC: 4.8 MMOL/L (ref 3.5–5.1)
PROT SERPL-MCNC: 7.1 GM/DL (ref 6.4–8.3)
RBC # BLD AUTO: 5.05 X10(6)/MCL (ref 4.7–6.1)
SODIUM SERPL-SCNC: 139 MMOL/L (ref 136–145)
WBC # BLD AUTO: 4.88 X10(3)/MCL (ref 4.5–11.5)

## 2025-02-10 PROCEDURE — 36415 COLL VENOUS BLD VENIPUNCTURE: CPT

## 2025-02-10 PROCEDURE — 82378 CARCINOEMBRYONIC ANTIGEN: CPT

## 2025-02-10 PROCEDURE — 85025 COMPLETE CBC W/AUTO DIFF WBC: CPT

## 2025-02-10 PROCEDURE — 99999 PR PBB SHADOW E&M-EST. PATIENT-LVL IV: CPT | Mod: PBBFAC,,, | Performed by: INTERNAL MEDICINE

## 2025-02-10 PROCEDURE — 80053 COMPREHEN METABOLIC PANEL: CPT

## 2025-02-10 RX ORDER — CYCLOBENZAPRINE HCL 5 MG
5-10 TABLET ORAL NIGHTLY PRN
COMMUNITY
Start: 2024-10-28

## 2025-02-10 RX ORDER — FLUOXETINE HYDROCHLORIDE 40 MG/1
40 CAPSULE ORAL DAILY
COMMUNITY

## (undated) DEVICE — KIT BIOGUARD AIR WTR SUC VALVE

## (undated) DEVICE — SOL IRRI STRL WATER 1000ML

## (undated) DEVICE — BASIN EMESIS GRAPHITE 500ML

## (undated) DEVICE — CONNECTOR TORRENT SCP OLYMPUS

## (undated) DEVICE — TUBE SUC UNIVERSAL .25XIN 6FT

## (undated) DEVICE — UNDERPAD DISPOSABLE 30X30IN

## (undated) DEVICE — KIT VIA CUSTOM PROCEDURE

## (undated) DEVICE — CONNECTOR WATERJET ENDO

## (undated) DEVICE — TUBING OXYGEN CONNECT BUBBLE

## (undated) DEVICE — LINER SUCTION CANNISTER REGUGA

## (undated) DEVICE — ELECTRODE MEDI-TRACE 855 FOAM

## (undated) DEVICE — UNDERPAD DELUXE FLUFF 30X30IN

## (undated) DEVICE — TUBING IRRIGATION W/ AIR

## (undated) DEVICE — SYR SLIP TIP 60 CC DISP

## (undated) DEVICE — GOWN SURGICAL BRTHBL XL

## (undated) DEVICE — SPONGE DRY VIA GREEN

## (undated) DEVICE — GOWN NONREINF SET-IN SLV XL

## (undated) DEVICE — CANNULA SSOFT C02 MALE 14FT

## (undated) DEVICE — SEE MEDLINE ITEM 152546

## (undated) DEVICE — CANNULA SUPERSOFT CO2 M AD 7FT

## (undated) DEVICE — TUBE TORRENT IRRIGATION

## (undated) DEVICE — ELECTRODE FOAM 535 TEARDROP